# Patient Record
Sex: FEMALE | Race: BLACK OR AFRICAN AMERICAN | NOT HISPANIC OR LATINO
[De-identification: names, ages, dates, MRNs, and addresses within clinical notes are randomized per-mention and may not be internally consistent; named-entity substitution may affect disease eponyms.]

---

## 2017-02-28 ENCOUNTER — FORM ENCOUNTER (OUTPATIENT)
Age: 42
End: 2017-02-28

## 2017-03-01 ENCOUNTER — OUTPATIENT (OUTPATIENT)
Dept: OUTPATIENT SERVICES | Facility: HOSPITAL | Age: 42
LOS: 1 days | End: 2017-03-01
Payer: MEDICAID

## 2017-03-01 ENCOUNTER — OUTPATIENT (OUTPATIENT)
Dept: OUTPATIENT SERVICES | Facility: HOSPITAL | Age: 42
LOS: 1 days | End: 2017-03-01
Payer: COMMERCIAL

## 2017-03-01 DIAGNOSIS — E66.01 MORBID (SEVERE) OBESITY DUE TO EXCESS CALORIES: ICD-10-CM

## 2017-03-01 LAB
ALBUMIN SERPL ELPH-MCNC: 3.3 G/DL — LOW (ref 3.4–5)
ALP SERPL-CCNC: 56 U/L — SIGNIFICANT CHANGE UP (ref 40–120)
ALT FLD-CCNC: 19 U/L — SIGNIFICANT CHANGE UP (ref 12–42)
ANION GAP SERPL CALC-SCNC: 5 MMOL/L — LOW (ref 9–16)
APPEARANCE UR: CLEAR — SIGNIFICANT CHANGE UP
APTT BLD: 27.1 SEC — LOW (ref 27.5–37.4)
AST SERPL-CCNC: 15 U/L — SIGNIFICANT CHANGE UP (ref 15–37)
BACTERIA # UR AUTO: PRESENT /HPF
BILIRUB SERPL-MCNC: 0.5 MG/DL — SIGNIFICANT CHANGE UP (ref 0.2–1.2)
BILIRUB UR-MCNC: NEGATIVE — SIGNIFICANT CHANGE UP
BUN SERPL-MCNC: 18 MG/DL — SIGNIFICANT CHANGE UP (ref 7–23)
CALCIUM SERPL-MCNC: 8.7 MG/DL — SIGNIFICANT CHANGE UP (ref 8.5–10.5)
CHLORIDE SERPL-SCNC: 107 MMOL/L — SIGNIFICANT CHANGE UP (ref 96–108)
CO2 SERPL-SCNC: 29 MMOL/L — SIGNIFICANT CHANGE UP (ref 22–31)
COLOR SPEC: YELLOW — SIGNIFICANT CHANGE UP
CREAT SERPL-MCNC: 0.93 MG/DL — SIGNIFICANT CHANGE UP (ref 0.5–1.3)
DIFF PNL FLD: (no result)
EPI CELLS # UR: SIGNIFICANT CHANGE UP /HPF
GLUCOSE SERPL-MCNC: 76 MG/DL — SIGNIFICANT CHANGE UP (ref 70–99)
GLUCOSE UR QL: NEGATIVE — SIGNIFICANT CHANGE UP
HBA1C BLD-MCNC: 5.7 % — HIGH (ref 4.8–5.6)
HCT VFR BLD CALC: 36.1 % — SIGNIFICANT CHANGE UP (ref 34.5–45)
HGB BLD-MCNC: 11.6 G/DL — SIGNIFICANT CHANGE UP (ref 11.5–15.5)
INR BLD: 0.96 — SIGNIFICANT CHANGE UP (ref 0.88–1.16)
KETONES UR-MCNC: NEGATIVE — SIGNIFICANT CHANGE UP
LEUKOCYTE ESTERASE UR-ACNC: NEGATIVE — SIGNIFICANT CHANGE UP
MCHC RBC-ENTMCNC: 25.3 PG — LOW (ref 27–34)
MCHC RBC-ENTMCNC: 32.1 G/DL — SIGNIFICANT CHANGE UP (ref 32–36)
MCV RBC AUTO: 78.8 FL — LOW (ref 80–100)
NITRITE UR-MCNC: NEGATIVE — SIGNIFICANT CHANGE UP
PH UR: 7 — SIGNIFICANT CHANGE UP (ref 4–8)
PLATELET # BLD AUTO: 271 K/UL — SIGNIFICANT CHANGE UP (ref 150–400)
POTASSIUM SERPL-MCNC: 4.1 MMOL/L — SIGNIFICANT CHANGE UP (ref 3.5–5.3)
POTASSIUM SERPL-SCNC: 4.1 MMOL/L — SIGNIFICANT CHANGE UP (ref 3.5–5.3)
PROT SERPL-MCNC: 6.7 G/DL — SIGNIFICANT CHANGE UP (ref 6.4–8.2)
PROT UR-MCNC: 100 MG/DL
PROTHROM AB SERPL-ACNC: 10.7 SEC — SIGNIFICANT CHANGE UP (ref 10–13.1)
RBC # BLD: 4.58 M/UL — SIGNIFICANT CHANGE UP (ref 3.8–5.2)
RBC # FLD: 15.3 % — SIGNIFICANT CHANGE UP (ref 10.3–16.9)
RBC CASTS # UR COMP ASSIST: (no result) /HPF
SODIUM SERPL-SCNC: 141 MMOL/L — SIGNIFICANT CHANGE UP (ref 135–145)
SP GR SPEC: 1.02 — SIGNIFICANT CHANGE UP (ref 1–1.03)
TSH SERPL-MCNC: 1.2 UIU/ML — SIGNIFICANT CHANGE UP (ref 0.35–4.94)
UROBILINOGEN FLD QL: 0.2 E.U./DL — SIGNIFICANT CHANGE UP
WBC # BLD: 5.5 K/UL — SIGNIFICANT CHANGE UP (ref 3.8–10.5)
WBC # FLD AUTO: 5.5 K/UL — SIGNIFICANT CHANGE UP (ref 3.8–10.5)
WBC UR QL: < 5 /HPF — SIGNIFICANT CHANGE UP

## 2017-03-01 PROCEDURE — 81001 URINALYSIS AUTO W/SCOPE: CPT

## 2017-03-01 PROCEDURE — 71046 X-RAY EXAM CHEST 2 VIEWS: CPT

## 2017-03-01 PROCEDURE — 81003 URINALYSIS AUTO W/O SCOPE: CPT

## 2017-03-01 PROCEDURE — 80053 COMPREHEN METABOLIC PANEL: CPT

## 2017-03-01 PROCEDURE — 85730 THROMBOPLASTIN TIME PARTIAL: CPT

## 2017-03-01 PROCEDURE — 83036 HEMOGLOBIN GLYCOSYLATED A1C: CPT

## 2017-03-01 PROCEDURE — 84443 ASSAY THYROID STIM HORMONE: CPT

## 2017-03-01 PROCEDURE — 93010 ELECTROCARDIOGRAM REPORT: CPT

## 2017-03-01 PROCEDURE — 85027 COMPLETE CBC AUTOMATED: CPT

## 2017-03-01 PROCEDURE — 93005 ELECTROCARDIOGRAM TRACING: CPT

## 2017-03-01 PROCEDURE — 85610 PROTHROMBIN TIME: CPT

## 2017-03-01 PROCEDURE — 71020: CPT | Mod: 26

## 2017-03-03 ENCOUNTER — APPOINTMENT (OUTPATIENT)
Dept: BARIATRICS | Facility: CLINIC | Age: 42
End: 2017-03-03

## 2017-03-03 VITALS
HEART RATE: 71 BPM | WEIGHT: 241.03 LBS | OXYGEN SATURATION: 98 % | DIASTOLIC BLOOD PRESSURE: 98 MMHG | BODY MASS INDEX: 40.16 KG/M2 | HEIGHT: 65 IN | SYSTOLIC BLOOD PRESSURE: 143 MMHG | TEMPERATURE: 98.1 F

## 2017-03-03 RX ORDER — METFORMIN ER 500 MG 500 MG/1
500 TABLET ORAL
Qty: 120 | Refills: 0 | Status: DISCONTINUED | COMMUNITY
Start: 2016-02-10

## 2017-03-03 RX ORDER — AMOXICILLIN 500 MG/1
500 CAPSULE ORAL
Qty: 56 | Refills: 0 | Status: DISCONTINUED | COMMUNITY
Start: 2016-11-09

## 2017-03-03 RX ORDER — CLARITHROMYCIN 500 MG/1
500 TABLET, FILM COATED ORAL
Qty: 28 | Refills: 0 | Status: DISCONTINUED | COMMUNITY
Start: 2016-11-09

## 2017-03-03 RX ORDER — FLUOROMETHOLONE 1 MG/ML
0.1 SOLUTION/ DROPS OPHTHALMIC
Qty: 5 | Refills: 0 | Status: DISCONTINUED | COMMUNITY
Start: 2017-02-26

## 2017-03-03 RX ORDER — SUMATRIPTAN 50 MG/1
50 TABLET, FILM COATED ORAL
Qty: 9 | Refills: 0 | Status: DISCONTINUED | COMMUNITY
Start: 2016-11-08

## 2017-03-03 RX ORDER — ACETAMINOPHEN 500 MG/1
500 TABLET ORAL
Qty: 90 | Refills: 0 | Status: DISCONTINUED | COMMUNITY
Start: 2016-09-19

## 2017-03-03 RX ORDER — BISOPROLOL FUMARATE 5 MG/1
5 TABLET, FILM COATED ORAL
Qty: 60 | Refills: 0 | Status: DISCONTINUED | COMMUNITY
Start: 2017-01-05

## 2017-03-03 RX ORDER — RANITIDINE 150 MG/1
150 TABLET ORAL
Qty: 60 | Refills: 0 | Status: DISCONTINUED | COMMUNITY
Start: 2016-07-25

## 2017-03-03 RX ORDER — TOPIRAMATE 25 MG/1
25 TABLET, FILM COATED ORAL
Qty: 60 | Refills: 0 | Status: DISCONTINUED | COMMUNITY
Start: 2016-02-08

## 2017-03-03 RX ORDER — OMEPRAZOLE 40 MG/1
40 CAPSULE, DELAYED RELEASE ORAL
Qty: 28 | Refills: 0 | Status: DISCONTINUED | COMMUNITY
Start: 2016-11-09

## 2017-03-08 RX ORDER — SCOPALAMINE 1 MG/3D
1.5 PATCH, EXTENDED RELEASE TRANSDERMAL ONCE
Qty: 0 | Refills: 0 | Status: COMPLETED | OUTPATIENT
Start: 2017-03-13 | End: 2017-03-13

## 2017-03-10 VITALS
RESPIRATION RATE: 16 BRPM | TEMPERATURE: 97 F | SYSTOLIC BLOOD PRESSURE: 167 MMHG | OXYGEN SATURATION: 98 % | WEIGHT: 244.05 LBS | HEART RATE: 75 BPM | HEIGHT: 65 IN | DIASTOLIC BLOOD PRESSURE: 95 MMHG

## 2017-03-10 RX ORDER — METFORMIN HYDROCHLORIDE 850 MG/1
1 TABLET ORAL
Qty: 0 | Refills: 0 | COMMUNITY

## 2017-03-10 RX ORDER — RANITIDINE HYDROCHLORIDE 150 MG/1
0 TABLET, FILM COATED ORAL
Qty: 0 | Refills: 0 | COMMUNITY

## 2017-03-10 RX ORDER — PANTOPRAZOLE SODIUM 20 MG/1
1 TABLET, DELAYED RELEASE ORAL
Qty: 0 | Refills: 0 | COMMUNITY

## 2017-03-10 RX ORDER — ATORVASTATIN CALCIUM 80 MG/1
1 TABLET, FILM COATED ORAL
Qty: 0 | Refills: 0 | COMMUNITY

## 2017-03-10 RX ORDER — SUMATRIPTAN SUCCINATE 4 MG/.5ML
0 INJECTION, SOLUTION SUBCUTANEOUS
Qty: 0 | Refills: 0 | COMMUNITY

## 2017-03-10 NOTE — PRE-OP CHECKLIST - SELECT TESTS ORDERED
PT/PTT/EKG/BMP/INR/CBC/urine C&S, EGD report/CXR/Urinalysis urine C&S, EGD report, ucg - negative/BMP/CBC/INR/EKG/PT/PTT/CXR/Urinalysis

## 2017-03-12 ENCOUNTER — RESULT REVIEW (OUTPATIENT)
Age: 42
End: 2017-03-12

## 2017-03-13 ENCOUNTER — INPATIENT (INPATIENT)
Facility: HOSPITAL | Age: 42
LOS: 1 days | Discharge: HOME | DRG: 621 | End: 2017-03-15
Attending: SURGERY | Admitting: SURGERY
Payer: MEDICAID

## 2017-03-13 ENCOUNTER — APPOINTMENT (OUTPATIENT)
Dept: BARIATRICS | Facility: HOSPITAL | Age: 42
End: 2017-03-13

## 2017-03-13 DIAGNOSIS — E66.01 MORBID (SEVERE) OBESITY DUE TO EXCESS CALORIES: ICD-10-CM

## 2017-03-13 DIAGNOSIS — Z90.49 ACQUIRED ABSENCE OF OTHER SPECIFIED PARTS OF DIGESTIVE TRACT: Chronic | ICD-10-CM

## 2017-03-13 LAB
HCT VFR BLD CALC: 39.2 % — SIGNIFICANT CHANGE UP (ref 34.5–45)
HGB BLD-MCNC: 12.7 G/DL — SIGNIFICANT CHANGE UP (ref 11.5–15.5)
MCHC RBC-ENTMCNC: 25.8 PG — LOW (ref 27–34)
MCHC RBC-ENTMCNC: 32.4 G/DL — SIGNIFICANT CHANGE UP (ref 32–36)
MCV RBC AUTO: 79.5 FL — LOW (ref 80–100)
PLATELET # BLD AUTO: 283 K/UL — SIGNIFICANT CHANGE UP (ref 150–400)
RBC # BLD: 4.93 M/UL — SIGNIFICANT CHANGE UP (ref 3.8–5.2)
RBC # FLD: 15.3 % — SIGNIFICANT CHANGE UP (ref 10.3–16.9)
WBC # BLD: 10.4 K/UL — SIGNIFICANT CHANGE UP (ref 3.8–10.5)
WBC # FLD AUTO: 10.4 K/UL — SIGNIFICANT CHANGE UP (ref 3.8–10.5)

## 2017-03-13 PROCEDURE — 43775 LAP SLEEVE GASTRECTOMY: CPT

## 2017-03-13 RX ORDER — SUMATRIPTAN SUCCINATE 4 MG/.5ML
1 INJECTION, SOLUTION SUBCUTANEOUS
Qty: 0 | Refills: 0 | COMMUNITY

## 2017-03-13 RX ORDER — DEXTROSE 50 % IN WATER 50 %
25 SYRINGE (ML) INTRAVENOUS ONCE
Qty: 0 | Refills: 0 | Status: DISCONTINUED | OUTPATIENT
Start: 2017-03-13 | End: 2017-03-15

## 2017-03-13 RX ORDER — PANTOPRAZOLE SODIUM 20 MG/1
40 TABLET, DELAYED RELEASE ORAL DAILY
Qty: 0 | Refills: 0 | Status: DISCONTINUED | OUTPATIENT
Start: 2017-03-13 | End: 2017-03-14

## 2017-03-13 RX ORDER — ATORVASTATIN CALCIUM 80 MG/1
40 TABLET, FILM COATED ORAL AT BEDTIME
Qty: 0 | Refills: 0 | Status: DISCONTINUED | OUTPATIENT
Start: 2017-03-14 | End: 2017-03-15

## 2017-03-13 RX ORDER — BISOPROLOL FUMARATE 10 MG/1
1 TABLET, FILM COATED ORAL
Qty: 0 | Refills: 0 | COMMUNITY

## 2017-03-13 RX ORDER — SODIUM CHLORIDE 9 MG/ML
1000 INJECTION, SOLUTION INTRAVENOUS
Qty: 0 | Refills: 0 | Status: DISCONTINUED | OUTPATIENT
Start: 2017-03-13 | End: 2017-03-15

## 2017-03-13 RX ORDER — HYDROMORPHONE HYDROCHLORIDE 2 MG/ML
1 INJECTION INTRAMUSCULAR; INTRAVENOUS; SUBCUTANEOUS EVERY 4 HOURS
Qty: 0 | Refills: 0 | Status: DISCONTINUED | OUTPATIENT
Start: 2017-03-13 | End: 2017-03-14

## 2017-03-13 RX ORDER — HYDROMORPHONE HYDROCHLORIDE 2 MG/ML
0.5 INJECTION INTRAMUSCULAR; INTRAVENOUS; SUBCUTANEOUS
Qty: 0 | Refills: 0 | Status: COMPLETED | OUTPATIENT
Start: 2017-03-13 | End: 2017-03-13

## 2017-03-13 RX ORDER — ENOXAPARIN SODIUM 100 MG/ML
40 INJECTION SUBCUTANEOUS ONCE
Qty: 0 | Refills: 0 | Status: COMPLETED | OUTPATIENT
Start: 2017-03-13 | End: 2017-03-13

## 2017-03-13 RX ORDER — DEXTROSE 50 % IN WATER 50 %
12.5 SYRINGE (ML) INTRAVENOUS ONCE
Qty: 0 | Refills: 0 | Status: DISCONTINUED | OUTPATIENT
Start: 2017-03-13 | End: 2017-03-15

## 2017-03-13 RX ORDER — GLUCAGON INJECTION, SOLUTION 0.5 MG/.1ML
1 INJECTION, SOLUTION SUBCUTANEOUS ONCE
Qty: 0 | Refills: 0 | Status: DISCONTINUED | OUTPATIENT
Start: 2017-03-13 | End: 2017-03-15

## 2017-03-13 RX ORDER — INSULIN LISPRO 100/ML
VIAL (ML) SUBCUTANEOUS
Qty: 0 | Refills: 0 | Status: DISCONTINUED | OUTPATIENT
Start: 2017-03-13 | End: 2017-03-15

## 2017-03-13 RX ORDER — SODIUM CHLORIDE 9 MG/ML
1000 INJECTION, SOLUTION INTRAVENOUS
Qty: 0 | Refills: 0 | Status: DISCONTINUED | OUTPATIENT
Start: 2017-03-13 | End: 2017-03-14

## 2017-03-13 RX ORDER — ONDANSETRON 8 MG/1
4 TABLET, FILM COATED ORAL EVERY 6 HOURS
Qty: 0 | Refills: 0 | Status: DISCONTINUED | OUTPATIENT
Start: 2017-03-13 | End: 2017-03-15

## 2017-03-13 RX ORDER — METOCLOPRAMIDE HCL 10 MG
10 TABLET ORAL EVERY 6 HOURS
Qty: 0 | Refills: 0 | Status: DISCONTINUED | OUTPATIENT
Start: 2017-03-13 | End: 2017-03-15

## 2017-03-13 RX ORDER — DEXTROSE 50 % IN WATER 50 %
1 SYRINGE (ML) INTRAVENOUS ONCE
Qty: 0 | Refills: 0 | Status: DISCONTINUED | OUTPATIENT
Start: 2017-03-13 | End: 2017-03-15

## 2017-03-13 RX ORDER — HYDROMORPHONE HYDROCHLORIDE 2 MG/ML
0.5 INJECTION INTRAMUSCULAR; INTRAVENOUS; SUBCUTANEOUS EVERY 4 HOURS
Qty: 0 | Refills: 0 | Status: DISCONTINUED | OUTPATIENT
Start: 2017-03-13 | End: 2017-03-14

## 2017-03-13 RX ORDER — METOCLOPRAMIDE HCL 10 MG
10 TABLET ORAL EVERY 6 HOURS
Qty: 0 | Refills: 0 | Status: DISCONTINUED | OUTPATIENT
Start: 2017-03-13 | End: 2017-03-13

## 2017-03-13 RX ADMIN — HYDROMORPHONE HYDROCHLORIDE 0.5 MILLIGRAM(S): 2 INJECTION INTRAMUSCULAR; INTRAVENOUS; SUBCUTANEOUS at 17:21

## 2017-03-13 RX ADMIN — Medication 10 MILLIGRAM(S): at 19:08

## 2017-03-13 RX ADMIN — ONDANSETRON 4 MILLIGRAM(S): 8 TABLET, FILM COATED ORAL at 16:49

## 2017-03-13 RX ADMIN — HYDROMORPHONE HYDROCHLORIDE 1 MILLIGRAM(S): 2 INJECTION INTRAMUSCULAR; INTRAVENOUS; SUBCUTANEOUS at 19:50

## 2017-03-13 RX ADMIN — ENOXAPARIN SODIUM 40 MILLIGRAM(S): 100 INJECTION SUBCUTANEOUS at 11:52

## 2017-03-13 RX ADMIN — HYDROMORPHONE HYDROCHLORIDE 1 MILLIGRAM(S): 2 INJECTION INTRAMUSCULAR; INTRAVENOUS; SUBCUTANEOUS at 19:24

## 2017-03-13 RX ADMIN — PANTOPRAZOLE SODIUM 40 MILLIGRAM(S): 20 TABLET, DELAYED RELEASE ORAL at 15:19

## 2017-03-13 RX ADMIN — SCOPALAMINE 1.5 MILLIGRAM(S): 1 PATCH, EXTENDED RELEASE TRANSDERMAL at 11:52

## 2017-03-13 RX ADMIN — SODIUM CHLORIDE 150 MILLILITER(S): 9 INJECTION, SOLUTION INTRAVENOUS at 18:12

## 2017-03-13 RX ADMIN — HYDROMORPHONE HYDROCHLORIDE 0.5 MILLIGRAM(S): 2 INJECTION INTRAMUSCULAR; INTRAVENOUS; SUBCUTANEOUS at 16:40

## 2017-03-13 NOTE — H&P ADULT - NSHPPHYSICALEXAM_GEN_ALL_CORE
Gen: Patient in chair comfortable in NAD  Chest: CTABL, RRR, no m/r/g  Abdomen: obese, soft, non-tender, non-distended  Ext: no edema

## 2017-03-13 NOTE — H&P ADULT - ASSESSMENT
41 yo female with BMI 40.6, htn, hld, (previous diagnosis of DM, resolved on no meds) admitted after lap sleeve gastrectomy

## 2017-03-13 NOTE — PROGRESS NOTE ADULT - SUBJECTIVE AND OBJECTIVE BOX
POST-OP CHECK:    Procedure: LSG   Surgeon: Lanette  Findings: normal anatomy, previous lap pricila, lap sleeve gastrectomy performed with 2 black, 1 green, and 2 purple loads    S: Reports minimal pain localized to surgical site, adequately controlled on current pain meds. Denies N, V, CP, SOB, & calf tenderness.     O:   Vital Signs Last 24 Hrs  T(C): 36.6, Max: 36.6 (03-13 @ 14:25)  T(F): 97.8, Max: 97.8 (03-13 @ 14:25)  HR: 75 (75 - 92)  BP: 145/69 (145/69 - 176/95)  BP(mean): --  RR: 20 (14 - 20)  SpO2: 100% (97% - 100%)    Gen: NAD; A&Ox3; resting comfortably in bed.  CV: RRR.   Resp: CTAB; no increased WOB.   Abd: Soft, obese, appropriately TTP.   Wound: Port site incisions closed primarily; no signs of hematoma.  Extr: WWP; no edema; SCDs in place.

## 2017-03-13 NOTE — H&P ADULT - HISTORY OF PRESENT ILLNESS
41 yo female with PMHx of Htn, Hld, GERD, (previously diagnosed with DM and placed on metformin, but patient says this is resolved and not taking), with morbid obesity (BMI 40.6) who was admitted to [facility] after elective lap sleeve gastrectomy. The patient failed sustained weight loss on medical therapy alone as an outpatient.

## 2017-03-13 NOTE — PROVIDER CONTACT NOTE (OTHER) - BACKGROUND
noted surgical lap puncture sites x5 with Dermabond C/D/I. IV fluid in progress as ordered. positioned to comfort. oriented to unit, reinforced education given regarding use of call bell ---cont.--

## 2017-03-13 NOTE — PROVIDER CONTACT NOTE (OTHER) - SITUATION
received patient from recovery room via bed, awake alert responsive , oriented to name, place, time and situation. medicated for c/o nausea and pain with partial relief.

## 2017-03-13 NOTE — BRIEF OPERATIVE NOTE - OPERATION/FINDINGS
normal anatomy, previous lap pricila, lap sleeve gastrectomy performed with 2 black, 1 green, and 2 purple loads

## 2017-03-13 NOTE — H&P ADULT - PROBLEM SELECTOR PLAN 1
- admit to regional floor  - IVF  - pain/nausea control  - BCLD  - SCD  - UGI POD1  - protonix  - ooba  - IS  - 6hr post op CBC

## 2017-03-14 ENCOUNTER — TRANSCRIPTION ENCOUNTER (OUTPATIENT)
Age: 42
End: 2017-03-14

## 2017-03-14 LAB
ANION GAP SERPL CALC-SCNC: 7 MMOL/L — LOW (ref 9–16)
BUN SERPL-MCNC: 11 MG/DL — SIGNIFICANT CHANGE UP (ref 7–23)
CALCIUM SERPL-MCNC: 8.6 MG/DL — SIGNIFICANT CHANGE UP (ref 8.5–10.5)
CHLORIDE SERPL-SCNC: 103 MMOL/L — SIGNIFICANT CHANGE UP (ref 96–108)
CO2 SERPL-SCNC: 31 MMOL/L — SIGNIFICANT CHANGE UP (ref 22–31)
CREAT SERPL-MCNC: 1.02 MG/DL — SIGNIFICANT CHANGE UP (ref 0.5–1.3)
GLUCOSE SERPL-MCNC: 102 MG/DL — HIGH (ref 70–99)
HCT VFR BLD CALC: 37.1 % — SIGNIFICANT CHANGE UP (ref 34.5–45)
HGB BLD-MCNC: 12.1 G/DL — SIGNIFICANT CHANGE UP (ref 11.5–15.5)
MAGNESIUM SERPL-MCNC: 1.7 MG/DL — SIGNIFICANT CHANGE UP (ref 1.6–2.4)
MCHC RBC-ENTMCNC: 25.8 PG — LOW (ref 27–34)
MCHC RBC-ENTMCNC: 32.6 G/DL — SIGNIFICANT CHANGE UP (ref 32–36)
MCV RBC AUTO: 79.1 FL — LOW (ref 80–100)
PHOSPHATE SERPL-MCNC: 3.5 MG/DL — SIGNIFICANT CHANGE UP (ref 2.5–4.5)
PLATELET # BLD AUTO: 238 K/UL — SIGNIFICANT CHANGE UP (ref 150–400)
POTASSIUM SERPL-MCNC: 3.5 MMOL/L — SIGNIFICANT CHANGE UP (ref 3.5–5.3)
POTASSIUM SERPL-SCNC: 3.5 MMOL/L — SIGNIFICANT CHANGE UP (ref 3.5–5.3)
RBC # BLD: 4.69 M/UL — SIGNIFICANT CHANGE UP (ref 3.8–5.2)
RBC # FLD: 15.4 % — SIGNIFICANT CHANGE UP (ref 10.3–16.9)
SODIUM SERPL-SCNC: 141 MMOL/L — SIGNIFICANT CHANGE UP (ref 135–145)
WBC # BLD: 9.2 K/UL — SIGNIFICANT CHANGE UP (ref 3.8–10.5)
WBC # FLD AUTO: 9.2 K/UL — SIGNIFICANT CHANGE UP (ref 3.8–10.5)

## 2017-03-14 PROCEDURE — 74241: CPT | Mod: 26

## 2017-03-14 RX ORDER — SODIUM CHLORIDE 9 MG/ML
1000 INJECTION, SOLUTION INTRAVENOUS
Qty: 0 | Refills: 0 | Status: DISCONTINUED | OUTPATIENT
Start: 2017-03-14 | End: 2017-03-14

## 2017-03-14 RX ORDER — ASPIRIN/CALCIUM CARB/MAGNESIUM 324 MG
81 TABLET ORAL DAILY
Qty: 0 | Refills: 0 | Status: DISCONTINUED | OUTPATIENT
Start: 2017-03-14 | End: 2017-03-15

## 2017-03-14 RX ORDER — PANTOPRAZOLE SODIUM 20 MG/1
40 TABLET, DELAYED RELEASE ORAL DAILY
Qty: 0 | Refills: 0 | Status: DISCONTINUED | OUTPATIENT
Start: 2017-03-15 | End: 2017-03-15

## 2017-03-14 RX ORDER — SODIUM CHLORIDE 9 MG/ML
1000 INJECTION, SOLUTION INTRAVENOUS
Qty: 0 | Refills: 0 | Status: DISCONTINUED | OUTPATIENT
Start: 2017-03-14 | End: 2017-03-15

## 2017-03-14 RX ORDER — POTASSIUM CHLORIDE 20 MEQ
40 PACKET (EA) ORAL ONCE
Qty: 0 | Refills: 0 | Status: COMPLETED | OUTPATIENT
Start: 2017-03-14 | End: 2017-03-14

## 2017-03-14 RX ORDER — MAGNESIUM SULFATE 500 MG/ML
1 VIAL (ML) INJECTION ONCE
Qty: 0 | Refills: 0 | Status: COMPLETED | OUTPATIENT
Start: 2017-03-14 | End: 2017-03-14

## 2017-03-14 RX ADMIN — Medication 81 MILLIGRAM(S): at 12:20

## 2017-03-14 RX ADMIN — HYDROMORPHONE HYDROCHLORIDE 1 MILLIGRAM(S): 2 INJECTION INTRAMUSCULAR; INTRAVENOUS; SUBCUTANEOUS at 08:20

## 2017-03-14 RX ADMIN — ATORVASTATIN CALCIUM 40 MILLIGRAM(S): 80 TABLET, FILM COATED ORAL at 22:06

## 2017-03-14 RX ADMIN — HYDROMORPHONE HYDROCHLORIDE 1 MILLIGRAM(S): 2 INJECTION INTRAMUSCULAR; INTRAVENOUS; SUBCUTANEOUS at 02:10

## 2017-03-14 RX ADMIN — PANTOPRAZOLE SODIUM 40 MILLIGRAM(S): 20 TABLET, DELAYED RELEASE ORAL at 12:20

## 2017-03-14 RX ADMIN — Medication 100 GRAM(S): at 12:20

## 2017-03-14 RX ADMIN — SODIUM CHLORIDE 150 MILLILITER(S): 9 INJECTION, SOLUTION INTRAVENOUS at 02:10

## 2017-03-14 RX ADMIN — Medication 40 MILLIEQUIVALENT(S): at 12:20

## 2017-03-14 RX ADMIN — HYDROMORPHONE HYDROCHLORIDE 1 MILLIGRAM(S): 2 INJECTION INTRAMUSCULAR; INTRAVENOUS; SUBCUTANEOUS at 02:45

## 2017-03-14 RX ADMIN — HYDROMORPHONE HYDROCHLORIDE 1 MILLIGRAM(S): 2 INJECTION INTRAMUSCULAR; INTRAVENOUS; SUBCUTANEOUS at 07:58

## 2017-03-14 NOTE — DISCHARGE NOTE ADULT - HOSPITAL COURSE
This patient presented to [facility] for laparoscopic sleeve gastrectomy. The operation was uncomplicated and post-operative course was uneventful. Post-op, pain and nausea were controlled. The hemoglobin level was stable post-op thus anticoagulation was re-started per protocol. An upper GI series post-op showed no evidence of a leak. Patient's diet has been advanced to bariatric clear liquids, which has been tolerated without nausea/vomiting. Patient is ambulating without difficulty and voiding spontaneously with bowel function. Pain is well controlled on oral pain medication. This patient is deemed ready for discharge and will follow up with the surgeon in the outpatient setting.

## 2017-03-14 NOTE — DISCHARGE NOTE ADULT - PLAN OF CARE
Recovery post-op Follow up with Dr. Gama in 1 week. Call the office at  to schedule your appointment. You may shower; soap and water over incision sites. Do not scrub. Pat dry when done. No tub bathing or swimming until cleared. Keep incision sites out of the sun as scars will darken. No heavy lifting (>10lbs) or strenuous exercise. Diet: Bariatric Full Fluids. 60 grams protein daily.  64 fluid ounces water daily. Drink small sips throughout the day. Continue diet as outlined by paperwork received as a pre-operative patient. You should be urinating at least 3-4x per day. Call the office if you experience increasing abdominal pain, nausea, vomiting, or temperature >100.4F.  NO ASPIRIN OR NSAIDs until approved by Dr. Gama. Avoid alcoholic beverages until cleared by Dr. Gama.

## 2017-03-14 NOTE — DISCHARGE NOTE ADULT - CARE PROVIDERS DIRECT ADDRESSES
[email or web address]

## 2017-03-14 NOTE — DISCHARGE NOTE ADULT - MEDICATION SUMMARY - MEDICATIONS TO TAKE
I will START or STAY ON the medications listed below when I get home from the hospital:    Percocet 5/325 325 mg-5 mg oral tablet  -- 1 tab(s) by mouth every 4 to 6 hours, As Needed -for severe pain MDD:6  -- Caution federal law prohibits the transfer of this drug to any person other  than the person for whom it was prescribed.  May cause drowsiness.  Alcohol may intensify this effect.  Use care when operating dangerous machinery.  This prescription cannot be refilled.  This product contains acetaminophen.  Do not use  with any other product containing acetaminophen to prevent possible liver damage.  Using more of this medication than prescribed may cause serious breathing problems.    -- Indication: For Post operative pain    aspirin 81 mg oral tablet, chewable  -- 1 tab(s) by mouth once a day MDD:1  -- Chew tablets before swallowing  Take with food or milk.    -- Indication: For Anticoagulation     Topamax 25 mg oral tablet  -- 1 tab(s) by mouth once a day  -- Indication: For Migraine headache    atorvastatin 40 mg oral tablet  -- 1 tab(s) by mouth once a day  -- Indication: For Home medication    pantoprazole 40 mg oral delayed release tablet  -- 1 tab(s) by mouth once a day  -- Indication: For Home medication    Protonix 40 mg oral delayed release tablet  -- 1 tab(s) by mouth once a day MDD:1  -- It is very important that you take or use this exactly as directed.  Do not skip doses or discontinue unless directed by your doctor.  Obtain medical advice before taking any non-prescription drugs as some may affect the action of this medication.  Swallow whole.  Do not crush.    -- Indication: For Post operative acid reflux I will START or STAY ON the medications listed below when I get home from the hospital:    Percocet 5/325 325 mg-5 mg oral tablet  -- 1 tab(s) by mouth every 4 to 6 hours, As Needed -for severe pain MDD:6  -- Caution federal law prohibits the transfer of this drug to any person other  than the person for whom it was prescribed.  May cause drowsiness.  Alcohol may intensify this effect.  Use care when operating dangerous machinery.  This prescription cannot be refilled.  This product contains acetaminophen.  Do not use  with any other product containing acetaminophen to prevent possible liver damage.  Using more of this medication than prescribed may cause serious breathing problems.    -- Indication: For Post operative pain    aspirin 81 mg oral tablet, chewable  -- 1 tab(s) by mouth once a day MDD:1  -- Chew tablets before swallowing  Take with food or milk.    -- Indication: For Anticoagulation     Topamax 25 mg oral tablet  -- 1 tab(s) by mouth once a day  -- Indication: For Migraine headache    atorvastatin 40 mg oral tablet  -- 1 tab(s) by mouth once a day  -- Indication: For Home medication    Protonix 40 mg oral delayed release tablet  -- 1 tab(s) by mouth once a day MDD:1  -- It is very important that you take or use this exactly as directed.  Do not skip doses or discontinue unless directed by your doctor.  Obtain medical advice before taking any non-prescription drugs as some may affect the action of this medication.  Swallow whole.  Do not crush.    -- Indication: For Post operative acid reflux

## 2017-03-14 NOTE — DISCHARGE NOTE ADULT - MEDICATION SUMMARY - MEDICATIONS TO STOP TAKING
I will STOP taking the medications listed below when I get home from the hospital:    hydroCHLOROthiazide 12.5 mg oral tablet  -- 1 tab(s) by mouth once a day    pantoprazole 40 mg oral delayed release tablet  -- 1 tab(s) by mouth once a day

## 2017-03-14 NOTE — DISCHARGE NOTE ADULT - PATIENT PORTAL LINK FT
“You can access the [employer] Patient Portal, offered by [facility], by registering with the following website: [email or web address]”

## 2017-03-14 NOTE — PROGRESS NOTE ADULT - SUBJECTIVE AND OBJECTIVE BOX
O/N: Afebrile, VSS. Post op HH 12.7/39.2. passed TOV  3/13: POC WNL.     STATUS POST: laparoscopic sleeve gastrectomy     POST OP DAY #: 1 DAILY PROGRESS NOTE:    INTERVAL HPI / OVERNIGHT EVENTS:  O/N: Afebrile, VSS. Post op HH 12.7/39.2. passed TOV  3/13: POC WNL.     STATUS POST: laparoscopic sleeve gastrectomy     POST OP DAY #: 1    SUBJECTIVE:   Pt seen & examined at bedside. Pain controlled. Nauseated w/ small amt of spit up. No complaints. No F/C, CP/SOB.    MEDICATIONS  (STANDING):  lactated ringers. 1000milliLiter(s) IV Continuous <Continuous>  pantoprazole  Injectable 40milliGRAM(s) IV Push daily  insulin lispro (HumaLOG) corrective regimen sliding scale  SubCutaneous four times a day with meals  dextrose 5%. 1000milliLiter(s) IV Continuous <Continuous>  dextrose 50% Injectable 12.5Gram(s) IV Push once  dextrose 50% Injectable 25Gram(s) IV Push once  dextrose 50% Injectable 25Gram(s) IV Push once  atorvastatin 40milliGRAM(s) Oral at bedtime    MEDICATIONS  (PRN):  HYDROmorphone  Injectable 0.5milliGRAM(s) IV Push every 4 hours PRN Moderate Pain  HYDROmorphone  Injectable 1milliGRAM(s) IV Push every 4 hours PRN Severe Pain  ondansetron Injectable 4milliGRAM(s) IV Push every 6 hours PRN Nausea  dextrose Gel 1Dose(s) Oral once PRN Blood Glucose LESS THAN 70 milliGRAM(s)/deciliter  glucagon  Injectable 1milliGRAM(s) IntraMuscular once PRN Glucose LESS THAN 70 milligrams/deciliter  metoclopramide Injectable 10milliGRAM(s) IV Push every 6 hours PRN Nausea and/or Vomiting, When in between zofran doses, breakthrough nausea      Vital Signs Last 24 Hrs  T(C): 37.1, Max: 37.1 (03-13 @ 19:12)  T(F): 98.8, Max: 98.8 (03-13 @ 19:12)  HR: 78 (75 - 96)  BP: 167/83 (131/81 - 176/95)  BP(mean): --  RR: 17 (14 - 20)  SpO2: 95% (95% - 100%)    PHYSICAL EXAM:  Neuro: NAD, A&Ox3  Resp: No incr WOB  CV: NSR, RRR   Abd: Soft, obese, appropriately mildly TTP.   : Voids  Extr: WWP, no edema  Incision(s): Port sites closed primarily w/ surgical glue in place. No signs of hematoma.     I&O's Detail    I & Os for current day (as of 14 Mar 2017 07:40)  =============================================  IN:    lactated ringers.: 2700 ml    Total IN: 2700 ml  ---------------------------------------------  OUT:    Voided: 1650 ml    Total OUT: 1650 ml  ---------------------------------------------  Total NET: 1050 ml      LABS:                        12.7   10.4  )-----------( 283      ( 13 Mar 2017 20:59 )             39.2

## 2017-03-14 NOTE — DISCHARGE NOTE ADULT - CARE PLAN
Principal Discharge DX:	Morbid obesity  Goal:	Recovery post-op Principal Discharge DX:	Morbid obesity  Goal:	Recovery post-op  Instructions for follow-up, activity and diet:	Follow up with Dr. Gama in 1 week. Call the office at  to schedule your appointment. You may shower; soap and water over incision sites. Do not scrub. Pat dry when done. No tub bathing or swimming until cleared. Keep incision sites out of the sun as scars will darken. No heavy lifting (>10lbs) or strenuous exercise. Diet: Bariatric Full Fluids. 60 grams protein daily.  64 fluid ounces water daily. Drink small sips throughout the day. Continue diet as outlined by paperwork received as a pre-operative patient. You should be urinating at least 3-4x per day. Call the office if you experience increasing abdominal pain, nausea, vomiting, or temperature >100.4F.  NO ASPIRIN OR NSAIDs until approved by Dr. Gama. Avoid alcoholic beverages until cleared by Dr. Gama.

## 2017-03-14 NOTE — DIETITIAN INITIAL EVALUATION ADULT. - ETIOLOGY
r/t predicted hypercaloric intake prior to admission and unsatisfactory results with previous weight loss attempts

## 2017-03-14 NOTE — DISCHARGE NOTE ADULT - ADDITIONAL INSTRUCTIONS
1) Please take Percocet 1-2 tabs by mouth every 4 to 6 hours as needed for pain control.  2) Please take Protonix once a day.  3) Please take Aspirin 81 mg chewable once a day.

## 2017-03-14 NOTE — DIETITIAN INITIAL EVALUATION ADULT. - OTHER INFO
Pt is s/p sleeve gastrectomy.  Pt endorses good tolerance of bariatric clears thus far.  Pt denies GI distress; pain is being managed.  NKFA.  Skin: surgical incision.

## 2017-03-14 NOTE — DIETITIAN INITIAL EVALUATION ADULT. - NS AS NUTRI INTERV ED CONTENT
Provided extensive nutrition education s/p bariatric surgery regarding diet progression, hydration and vitamin supplementation.

## 2017-03-14 NOTE — DIETITIAN INITIAL EVALUATION ADULT. - ENERGY NEEDS
IBW used as pt is currently greater than 120% ideal body weight.  Needs adjusted s/p bariatric surgery.   20-25cal/kg IBW for maintenance.   568-682kcal (10-12cal/kg IBW)  85-119g pro (1.5-2.1g pro/kg IBW)  >/= 1920cc fluid

## 2017-03-15 VITALS
HEART RATE: 70 BPM | RESPIRATION RATE: 17 BRPM | SYSTOLIC BLOOD PRESSURE: 138 MMHG | DIASTOLIC BLOOD PRESSURE: 90 MMHG | TEMPERATURE: 99 F | OXYGEN SATURATION: 97 %

## 2017-03-15 LAB
ANION GAP SERPL CALC-SCNC: 7 MMOL/L — LOW (ref 9–16)
BUN SERPL-MCNC: 12 MG/DL — SIGNIFICANT CHANGE UP (ref 7–23)
CALCIUM SERPL-MCNC: 8.9 MG/DL — SIGNIFICANT CHANGE UP (ref 8.5–10.5)
CHLORIDE SERPL-SCNC: 101 MMOL/L — SIGNIFICANT CHANGE UP (ref 96–108)
CO2 SERPL-SCNC: 32 MMOL/L — HIGH (ref 22–31)
CREAT SERPL-MCNC: 0.95 MG/DL — SIGNIFICANT CHANGE UP (ref 0.5–1.3)
GLUCOSE SERPL-MCNC: 77 MG/DL — SIGNIFICANT CHANGE UP (ref 70–99)
HCT VFR BLD CALC: 37.2 % — SIGNIFICANT CHANGE UP (ref 34.5–45)
HGB BLD-MCNC: 11.7 G/DL — SIGNIFICANT CHANGE UP (ref 11.5–15.5)
MAGNESIUM SERPL-MCNC: 1.9 MG/DL — SIGNIFICANT CHANGE UP (ref 1.6–2.4)
MCHC RBC-ENTMCNC: 25.1 PG — LOW (ref 27–34)
MCHC RBC-ENTMCNC: 31.5 G/DL — LOW (ref 32–36)
MCV RBC AUTO: 79.8 FL — LOW (ref 80–100)
PHOSPHATE SERPL-MCNC: 2.7 MG/DL — SIGNIFICANT CHANGE UP (ref 2.5–4.5)
PLATELET # BLD AUTO: 262 K/UL — SIGNIFICANT CHANGE UP (ref 150–400)
POTASSIUM SERPL-MCNC: 3.8 MMOL/L — SIGNIFICANT CHANGE UP (ref 3.5–5.3)
POTASSIUM SERPL-SCNC: 3.8 MMOL/L — SIGNIFICANT CHANGE UP (ref 3.5–5.3)
RBC # BLD: 4.66 M/UL — SIGNIFICANT CHANGE UP (ref 3.8–5.2)
RBC # FLD: 15.3 % — SIGNIFICANT CHANGE UP (ref 10.3–16.9)
SODIUM SERPL-SCNC: 140 MMOL/L — SIGNIFICANT CHANGE UP (ref 135–145)
SURGICAL PATHOLOGY STUDY: SIGNIFICANT CHANGE UP
WBC # BLD: 7.9 K/UL — SIGNIFICANT CHANGE UP (ref 3.8–10.5)
WBC # FLD AUTO: 7.9 K/UL — SIGNIFICANT CHANGE UP (ref 3.8–10.5)

## 2017-03-15 PROCEDURE — 83735 ASSAY OF MAGNESIUM: CPT

## 2017-03-15 PROCEDURE — 86901 BLOOD TYPING SEROLOGIC RH(D): CPT

## 2017-03-15 PROCEDURE — 88307 TISSUE EXAM BY PATHOLOGIST: CPT

## 2017-03-15 PROCEDURE — 36415 COLL VENOUS BLD VENIPUNCTURE: CPT

## 2017-03-15 PROCEDURE — 86850 RBC ANTIBODY SCREEN: CPT

## 2017-03-15 PROCEDURE — 84100 ASSAY OF PHOSPHORUS: CPT

## 2017-03-15 PROCEDURE — 74241: CPT

## 2017-03-15 PROCEDURE — 80048 BASIC METABOLIC PNL TOTAL CA: CPT

## 2017-03-15 PROCEDURE — 86900 BLOOD TYPING SEROLOGIC ABO: CPT

## 2017-03-15 PROCEDURE — 85027 COMPLETE CBC AUTOMATED: CPT

## 2017-03-15 RX ORDER — PANTOPRAZOLE SODIUM 20 MG/1
1 TABLET, DELAYED RELEASE ORAL
Qty: 0 | Refills: 0 | COMMUNITY

## 2017-03-15 RX ORDER — PANTOPRAZOLE SODIUM 20 MG/1
1 TABLET, DELAYED RELEASE ORAL
Qty: 30 | Refills: 0 | OUTPATIENT
Start: 2017-03-15 | End: 2017-04-14

## 2017-03-15 RX ORDER — POTASSIUM CHLORIDE 20 MEQ
20 PACKET (EA) ORAL ONCE
Qty: 0 | Refills: 0 | Status: COMPLETED | OUTPATIENT
Start: 2017-03-15 | End: 2017-03-15

## 2017-03-15 RX ORDER — ASPIRIN/CALCIUM CARB/MAGNESIUM 324 MG
1 TABLET ORAL
Qty: 30 | Refills: 0 | OUTPATIENT
Start: 2017-03-15 | End: 2017-04-14

## 2017-03-15 RX ADMIN — Medication 20 MILLIEQUIVALENT(S): at 11:24

## 2017-03-15 RX ADMIN — PANTOPRAZOLE SODIUM 40 MILLIGRAM(S): 20 TABLET, DELAYED RELEASE ORAL at 11:24

## 2017-03-15 RX ADMIN — Medication 81 MILLIGRAM(S): at 11:24

## 2017-03-15 NOTE — PROGRESS NOTE ADULT - SUBJECTIVE AND OBJECTIVE BOX
INTERVAL HPI/OVERNIGHT EVENTS: O/N: good PO intake, PACO, AVSS  3/14: Started ASA. UGI WNL. Tolerating BCLD. VSS.    STATUS POST:  Lap sleeve gastrectomy    POST OPERATIVE DAY #: 2 INTERVAL HPI/OVERNIGHT EVENTS: O/N: good PO intake, PACO, AVSS  3/14: Started ASA. UGI WNL. Tolerating BCLD. VSS.    STATUS POST:  Lap sleeve gastrectomy    POST OPERATIVE DAY #: 2    SUBJECTIVE:   Pt seen & examined at bedside. Pain controlled. No complaints. Era BCLD. Voiding. No F/C, N/V, CP/SOB.    MEDICATIONS  (STANDING):  insulin lispro (HumaLOG) corrective regimen sliding scale  SubCutaneous four times a day with meals  dextrose 5%. 1000milliLiter(s) IV Continuous <Continuous>  dextrose 50% Injectable 12.5Gram(s) IV Push once  dextrose 50% Injectable 25Gram(s) IV Push once  dextrose 50% Injectable 25Gram(s) IV Push once  atorvastatin 40milliGRAM(s) Oral at bedtime  pantoprazole    Tablet 40milliGRAM(s) Oral daily  lactated ringers. 1000milliLiter(s) IV Continuous <Continuous>  aspirin  chewable 81milliGRAM(s) Oral daily    MEDICATIONS  (PRN):  ondansetron Injectable 4milliGRAM(s) IV Push every 6 hours PRN Nausea  dextrose Gel 1Dose(s) Oral once PRN Blood Glucose LESS THAN 70 milliGRAM(s)/deciliter  glucagon  Injectable 1milliGRAM(s) IntraMuscular once PRN Glucose LESS THAN 70 milligrams/deciliter  metoclopramide Injectable 10milliGRAM(s) IV Push every 6 hours PRN Nausea and/or Vomiting, When in between zofran doses, breakthrough nausea  oxyCODONE  5 mG/acetaminophen 325 mG 1Tablet(s) Oral every 4 hours PRN Moderate Pain (4 - 6)  oxyCODONE  5 mG/acetaminophen 325 mG 2Tablet(s) Oral every 4 hours PRN Severe Pain (7 - 10)      Vital Signs Last 24 Hrs  T(C): 36.6, Max: 37.2 (03-14 @ 09:30)  T(F): 97.8, Max: 98.9 (03-14 @ 09:30)  HR: 68 (63 - 69)  BP: 140/80 (134/87 - 157/90)  BP(mean): --  RR: 17 (17 - 17)  SpO2: 95% (94% - 98%)    PHYSICAL EXAM:  Neuro: NAD, A&Ox3  Resp: No incr WOB  CV: NSR, RRR   Abd: Soft, obese, appropriately mildly TTP.   : Voids  Extr: WWP, no edema  Incision(s): Port sites closed primarily w/ surgical glue in place. No signs of hematoma.     I&O's Detail  I & Os for 24h ending 14 Mar 2017 07:00  =============================================  IN:    lactated ringers.: 2700 ml    Total IN: 2700 ml  ---------------------------------------------  OUT:    Voided: 1650 ml    Total OUT: 1650 ml  ---------------------------------------------  Total NET: 1050 ml    I & Os for current day (as of 15 Mar 2017 06:20)  =============================================  IN:    lactated ringers.: 1800 ml    lactated ringers.: 450 ml    Oral Fluid: 120 ml    Total IN: 2370 ml  ---------------------------------------------  OUT:    Total OUT: 0 ml  ---------------------------------------------  Total NET: 2370 ml      LABS:                        12.1   9.2   )-----------( 238      ( 14 Mar 2017 11:07 )             37.1     14 Mar 2017 11:07    141    |  103    |  11     ----------------------------<  102    3.5     |  31     |  1.02     Ca    8.6        14 Mar 2017 11:07  Phos  3.5       14 Mar 2017 11:07  Mg     1.7       14 Mar 2017 11:07 INTERVAL HPI/OVERNIGHT EVENTS: O/N: good PO intake, PACO, AVSS  3/14: Started ASA. UGI WNL. Tolerating BCLD. VSS.    STATUS POST:  Lap sleeve gastrectomy    POST OPERATIVE DAY #: 2    SUBJECTIVE:   Pt seen & examined at bedside. Pain controlled. No complaints. Era BCLD. Voiding. No F/C, N/V, CP/SOB.    MEDICATIONS  (STANDING):  insulin lispro (HumaLOG) corrective regimen sliding scale  SubCutaneous four times a day with meals  dextrose 5%. 1000milliLiter(s) IV Continuous <Continuous>  dextrose 50% Injectable 12.5Gram(s) IV Push once  dextrose 50% Injectable 25Gram(s) IV Push once  dextrose 50% Injectable 25Gram(s) IV Push once  atorvastatin 40milliGRAM(s) Oral at bedtime  pantoprazole    Tablet 40milliGRAM(s) Oral daily  lactated ringers. 1000milliLiter(s) IV Continuous <Continuous>  aspirin  chewable 81milliGRAM(s) Oral daily    MEDICATIONS  (PRN):  ondansetron Injectable 4milliGRAM(s) IV Push every 6 hours PRN Nausea  dextrose Gel 1Dose(s) Oral once PRN Blood Glucose LESS THAN 70 milliGRAM(s)/deciliter  glucagon  Injectable 1milliGRAM(s) IntraMuscular once PRN Glucose LESS THAN 70 milligrams/deciliter  metoclopramide Injectable 10milliGRAM(s) IV Push every 6 hours PRN Nausea and/or Vomiting, When in between zofran doses, breakthrough nausea  oxyCODONE  5 mG/acetaminophen 325 mG 1Tablet(s) Oral every 4 hours PRN Moderate Pain (4 - 6)  oxyCODONE  5 mG/acetaminophen 325 mG 2Tablet(s) Oral every 4 hours PRN Severe Pain (7 - 10)      Vital Signs Last 24 Hrs  T(C): 36.6, Max: 37.2 (03-14 @ 09:30)  T(F): 97.8, Max: 98.9 (03-14 @ 09:30)  HR: 68 (63 - 69)  BP: 140/80 (134/87 - 157/90)  BP(mean): --  RR: 17 (17 - 17)  SpO2: 95% (94% - 98%)    PHYSICAL EXAM:  Neuro: NAD, A&Ox3  Resp: No incr WOB  CV: NSR, RRR   Abd: Soft, obese, NT.   : Voids  Extr: WWP, no edema  Incision(s): Port sites closed primarily w/ surgical glue in place. No signs of hematoma.     I&O's Detail  I & Os for 24h ending 14 Mar 2017 07:00  =============================================  IN:    lactated ringers.: 2700 ml    Total IN: 2700 ml  ---------------------------------------------  OUT:    Voided: 1650 ml    Total OUT: 1650 ml  ---------------------------------------------  Total NET: 1050 ml    I & Os for current day (as of 15 Mar 2017 06:20)  =============================================  IN:    lactated ringers.: 1800 ml    lactated ringers.: 450 ml    Oral Fluid: 120 ml    Total IN: 2370 ml  ---------------------------------------------  OUT:    Total OUT: 0 ml  ---------------------------------------------  Total NET: 2370 ml      LABS:                        12.1   9.2   )-----------( 238      ( 14 Mar 2017 11:07 )             37.1     14 Mar 2017 11:07    141    |  103    |  11     ----------------------------<  102    3.5     |  31     |  1.02     Ca    8.6        14 Mar 2017 11:07  Phos  3.5       14 Mar 2017 11:07  Mg     1.7       14 Mar 2017 11:07

## 2017-03-16 PROBLEM — E66.01 MORBID (SEVERE) OBESITY DUE TO EXCESS CALORIES: Chronic | Status: ACTIVE | Noted: 2017-03-10

## 2017-03-16 PROBLEM — M25.569 PAIN IN UNSPECIFIED KNEE: Chronic | Status: ACTIVE | Noted: 2017-03-10

## 2017-03-16 PROBLEM — E55.9 VITAMIN D DEFICIENCY, UNSPECIFIED: Chronic | Status: ACTIVE | Noted: 2017-03-10

## 2017-03-16 PROBLEM — G43.909 MIGRAINE, UNSPECIFIED, NOT INTRACTABLE, WITHOUT STATUS MIGRAINOSUS: Chronic | Status: ACTIVE | Noted: 2017-03-10

## 2017-03-16 PROBLEM — I10 ESSENTIAL (PRIMARY) HYPERTENSION: Chronic | Status: ACTIVE | Noted: 2017-03-10

## 2017-03-17 ENCOUNTER — MESSAGE (OUTPATIENT)
Age: 42
End: 2017-03-17

## 2017-03-17 DIAGNOSIS — E11.9 TYPE 2 DIABETES MELLITUS WITHOUT COMPLICATIONS: ICD-10-CM

## 2017-03-17 DIAGNOSIS — K21.9 GASTRO-ESOPHAGEAL REFLUX DISEASE WITHOUT ESOPHAGITIS: ICD-10-CM

## 2017-03-17 DIAGNOSIS — E66.01 MORBID (SEVERE) OBESITY DUE TO EXCESS CALORIES: ICD-10-CM

## 2017-03-17 DIAGNOSIS — I10 ESSENTIAL (PRIMARY) HYPERTENSION: ICD-10-CM

## 2017-03-17 DIAGNOSIS — E55.9 VITAMIN D DEFICIENCY, UNSPECIFIED: ICD-10-CM

## 2017-03-22 ENCOUNTER — APPOINTMENT (OUTPATIENT)
Dept: BARIATRICS | Facility: CLINIC | Age: 42
End: 2017-03-22

## 2017-03-22 VITALS
HEART RATE: 68 BPM | DIASTOLIC BLOOD PRESSURE: 76 MMHG | SYSTOLIC BLOOD PRESSURE: 114 MMHG | WEIGHT: 233 LBS | HEIGHT: 65 IN | BODY MASS INDEX: 38.82 KG/M2 | TEMPERATURE: 97.8 F

## 2017-03-22 RX ORDER — OXYCODONE AND ACETAMINOPHEN 5; 325 MG/1; MG/1
5-325 TABLET ORAL
Qty: 20 | Refills: 0 | Status: DISCONTINUED | COMMUNITY
Start: 2017-03-15

## 2017-04-19 ENCOUNTER — APPOINTMENT (OUTPATIENT)
Dept: BARIATRICS | Facility: CLINIC | Age: 42
End: 2017-04-19

## 2017-04-19 VITALS
DIASTOLIC BLOOD PRESSURE: 83 MMHG | BODY MASS INDEX: 36.99 KG/M2 | HEART RATE: 73 BPM | SYSTOLIC BLOOD PRESSURE: 129 MMHG | WEIGHT: 222 LBS | HEIGHT: 65 IN

## 2017-06-14 ENCOUNTER — APPOINTMENT (OUTPATIENT)
Dept: BARIATRICS | Facility: CLINIC | Age: 42
End: 2017-06-14

## 2017-06-14 VITALS
BODY MASS INDEX: 34.86 KG/M2 | SYSTOLIC BLOOD PRESSURE: 99 MMHG | DIASTOLIC BLOOD PRESSURE: 66 MMHG | WEIGHT: 209.25 LBS | TEMPERATURE: 97.8 F | HEART RATE: 65 BPM | OXYGEN SATURATION: 99 % | HEIGHT: 65 IN

## 2017-06-14 DIAGNOSIS — R11.10 VOMITING, UNSPECIFIED: ICD-10-CM

## 2017-06-14 RX ORDER — ASPIRIN 81 MG/1
81 TABLET, CHEWABLE ORAL
Qty: 30 | Refills: 0 | Status: DISCONTINUED | COMMUNITY
Start: 2017-03-15 | End: 2017-06-14

## 2017-06-22 ENCOUNTER — OTHER (OUTPATIENT)
Age: 42
End: 2017-06-22

## 2017-07-19 ENCOUNTER — APPOINTMENT (OUTPATIENT)
Dept: BARIATRICS | Facility: CLINIC | Age: 42
End: 2017-07-19

## 2017-07-19 VITALS
HEIGHT: 65 IN | HEART RATE: 67 BPM | BODY MASS INDEX: 32.99 KG/M2 | SYSTOLIC BLOOD PRESSURE: 114 MMHG | WEIGHT: 198 LBS | DIASTOLIC BLOOD PRESSURE: 79 MMHG

## 2017-07-24 ENCOUNTER — APPOINTMENT (OUTPATIENT)
Dept: BARIATRICS | Facility: CLINIC | Age: 42
End: 2017-07-24

## 2017-07-24 VITALS — HEIGHT: 65 IN | WEIGHT: 197 LBS | BODY MASS INDEX: 32.82 KG/M2

## 2017-09-05 LAB
25(OH)D3 SERPL-MCNC: 40.7 NG/ML
A-TOCOPHEROL VIT E SERPL-MCNC: 16.8 MG/L
ALBUMIN SERPL ELPH-MCNC: 3.6 G/DL
ALP BLD-CCNC: 44 U/L
ALT SERPL-CCNC: 8 U/L
ANION GAP SERPL CALC-SCNC: 13 MMOL/L
AST SERPL-CCNC: 17 U/L
BASOPHILS # BLD AUTO: 0.01 K/UL
BASOPHILS NFR BLD AUTO: 0.3 %
BETA+GAMMA TOCOPHEROL SERPL-MCNC: <1 MG/L
BILIRUB SERPL-MCNC: 0.4 MG/DL
BUN SERPL-MCNC: 13 MG/DL
CA-I SERPL-SCNC: 1.24 MMOL/L
CALCIUM SERPL-MCNC: 9.1 MG/DL
CALCIUM SERPL-MCNC: 9.1 MG/DL
CHLORIDE SERPL-SCNC: 106 MMOL/L
CHOLEST SERPL-MCNC: 247 MG/DL
CHOLEST/HDLC SERPL: 4.2 RATIO
CO2 SERPL-SCNC: 25 MMOL/L
CREAT SERPL-MCNC: 0.94 MG/DL
EOSINOPHIL # BLD AUTO: 0.03 K/UL
EOSINOPHIL NFR BLD AUTO: 0.9 %
FOLATE SERPL-MCNC: 13.8 NG/ML
GLUCOSE SERPL-MCNC: 86 MG/DL
HBA1C MFR BLD HPLC: 5.6 %
HCT VFR BLD CALC: 34.9 %
HDLC SERPL-MCNC: 59 MG/DL
HGB BLD-MCNC: 11.1 G/DL
IMM GRANULOCYTES NFR BLD AUTO: 0 %
IRON SATN MFR SERPL: 39 %
IRON SERPL-MCNC: 80 UG/DL
LDLC SERPL CALC-MCNC: 169 MG/DL
LYMPHOCYTES # BLD AUTO: 1.22 K/UL
LYMPHOCYTES NFR BLD AUTO: 38.1 %
MAN DIFF?: NORMAL
MCHC RBC-ENTMCNC: 25.9 PG
MCHC RBC-ENTMCNC: 31.8 GM/DL
MCV RBC AUTO: 81.4 FL
MONOCYTES # BLD AUTO: 0.25 K/UL
MONOCYTES NFR BLD AUTO: 7.8 %
NEUTROPHILS # BLD AUTO: 1.69 K/UL
NEUTROPHILS NFR BLD AUTO: 52.9 %
PARATHYROID HORMONE INTACT: 49 PG/ML
PLATELET # BLD AUTO: 266 K/UL
POTASSIUM SERPL-SCNC: 4.4 MMOL/L
PREALB SERPL NEPH-MCNC: 21 MG/DL
PROT SERPL-MCNC: 6.2 G/DL
RBC # BLD: 4.29 M/UL
RBC # FLD: 15.4 %
SODIUM SERPL-SCNC: 144 MMOL/L
TIBC SERPL-MCNC: 204 UG/DL
TRIGL SERPL-MCNC: 94 MG/DL
TSH SERPL-ACNC: 0.75 UIU/ML
UIBC SERPL-MCNC: 124 UG/DL
VIT A SERPL-MCNC: 45 UG/DL
VIT B1 SERPL-MCNC: 122.4 NMOL/L
VIT B12 SERPL-MCNC: 1028 PG/ML
WBC # FLD AUTO: 3.2 K/UL
ZINC SERPL-MCNC: 70 UG/DL

## 2017-09-08 ENCOUNTER — APPOINTMENT (OUTPATIENT)
Dept: BARIATRICS | Facility: CLINIC | Age: 42
End: 2017-09-08
Payer: MEDICAID

## 2017-09-08 VITALS
BODY MASS INDEX: 31.9 KG/M2 | DIASTOLIC BLOOD PRESSURE: 90 MMHG | WEIGHT: 191.5 LBS | SYSTOLIC BLOOD PRESSURE: 124 MMHG | HEART RATE: 64 BPM | TEMPERATURE: 97.6 F | HEIGHT: 65 IN | OXYGEN SATURATION: 99 %

## 2017-09-08 DIAGNOSIS — E78.5 HYPERLIPIDEMIA, UNSPECIFIED: ICD-10-CM

## 2017-09-08 PROCEDURE — 99213 OFFICE O/P EST LOW 20 MIN: CPT

## 2017-11-02 ENCOUNTER — RESULT REVIEW (OUTPATIENT)
Age: 42
End: 2017-11-02

## 2017-12-08 ENCOUNTER — APPOINTMENT (OUTPATIENT)
Dept: BARIATRICS | Facility: CLINIC | Age: 42
End: 2017-12-08
Payer: MEDICAID

## 2017-12-08 VITALS
OXYGEN SATURATION: 98 % | WEIGHT: 181 LBS | BODY MASS INDEX: 30.16 KG/M2 | DIASTOLIC BLOOD PRESSURE: 80 MMHG | SYSTOLIC BLOOD PRESSURE: 120 MMHG | HEART RATE: 69 BPM | TEMPERATURE: 98.1 F | HEIGHT: 65 IN

## 2017-12-08 PROCEDURE — 99213 OFFICE O/P EST LOW 20 MIN: CPT

## 2017-12-08 RX ORDER — ATORVASTATIN CALCIUM 20 MG/1
20 TABLET, FILM COATED ORAL
Qty: 30 | Refills: 0 | Status: DISCONTINUED | COMMUNITY
Start: 2017-11-13

## 2018-01-23 VITALS
RESPIRATION RATE: 18 BRPM | HEIGHT: 65 IN | HEART RATE: 78 BPM | SYSTOLIC BLOOD PRESSURE: 135 MMHG | DIASTOLIC BLOOD PRESSURE: 99 MMHG | OXYGEN SATURATION: 100 % | WEIGHT: 179.9 LBS | TEMPERATURE: 98 F

## 2018-01-23 LAB
ALBUMIN SERPL ELPH-MCNC: 3.7 G/DL — SIGNIFICANT CHANGE UP (ref 3.3–5)
ALP SERPL-CCNC: 43 U/L — SIGNIFICANT CHANGE UP (ref 40–120)
ALT FLD-CCNC: 11 U/L — SIGNIFICANT CHANGE UP (ref 10–45)
ANION GAP SERPL CALC-SCNC: 7 MMOL/L — SIGNIFICANT CHANGE UP (ref 5–17)
APPEARANCE UR: CLEAR — SIGNIFICANT CHANGE UP
AST SERPL-CCNC: 19 U/L — SIGNIFICANT CHANGE UP (ref 10–40)
BASOPHILS NFR BLD AUTO: 0.5 % — SIGNIFICANT CHANGE UP (ref 0–2)
BILIRUB SERPL-MCNC: <0.2 MG/DL — SIGNIFICANT CHANGE UP (ref 0.2–1.2)
BILIRUB UR-MCNC: NEGATIVE — SIGNIFICANT CHANGE UP
BUN SERPL-MCNC: 12 MG/DL — SIGNIFICANT CHANGE UP (ref 7–23)
CALCIUM SERPL-MCNC: 8.7 MG/DL — SIGNIFICANT CHANGE UP (ref 8.4–10.5)
CHLORIDE SERPL-SCNC: 104 MMOL/L — SIGNIFICANT CHANGE UP (ref 96–108)
CO2 SERPL-SCNC: 29 MMOL/L — SIGNIFICANT CHANGE UP (ref 22–31)
COLOR SPEC: YELLOW — SIGNIFICANT CHANGE UP
CREAT SERPL-MCNC: 0.93 MG/DL — SIGNIFICANT CHANGE UP (ref 0.5–1.3)
DIFF PNL FLD: (no result)
EOSINOPHIL NFR BLD AUTO: 0.9 % — SIGNIFICANT CHANGE UP (ref 0–6)
GLUCOSE SERPL-MCNC: 102 MG/DL — HIGH (ref 70–99)
GLUCOSE UR QL: NEGATIVE — SIGNIFICANT CHANGE UP
HCT VFR BLD CALC: 32.1 % — LOW (ref 34.5–45)
HGB BLD-MCNC: 10.3 G/DL — LOW (ref 11.5–15.5)
KETONES UR-MCNC: (no result) MG/DL
LEUKOCYTE ESTERASE UR-ACNC: NEGATIVE — SIGNIFICANT CHANGE UP
LIDOCAIN IGE QN: 54 U/L — SIGNIFICANT CHANGE UP (ref 7–60)
LYMPHOCYTES # BLD AUTO: 47.7 % — HIGH (ref 13–44)
MCHC RBC-ENTMCNC: 25.6 PG — LOW (ref 27–34)
MCHC RBC-ENTMCNC: 32.1 G/DL — SIGNIFICANT CHANGE UP (ref 32–36)
MCV RBC AUTO: 79.7 FL — LOW (ref 80–100)
MONOCYTES NFR BLD AUTO: 6.8 % — SIGNIFICANT CHANGE UP (ref 2–14)
NEUTROPHILS NFR BLD AUTO: 44.1 % — SIGNIFICANT CHANGE UP (ref 43–77)
NITRITE UR-MCNC: NEGATIVE — SIGNIFICANT CHANGE UP
PH UR: 6 — SIGNIFICANT CHANGE UP (ref 5–8)
PLATELET # BLD AUTO: 283 K/UL — SIGNIFICANT CHANGE UP (ref 150–400)
POTASSIUM SERPL-MCNC: 3.9 MMOL/L — SIGNIFICANT CHANGE UP (ref 3.5–5.3)
POTASSIUM SERPL-SCNC: 3.9 MMOL/L — SIGNIFICANT CHANGE UP (ref 3.5–5.3)
PROT SERPL-MCNC: 6.5 G/DL — SIGNIFICANT CHANGE UP (ref 6–8.3)
PROT UR-MCNC: 100 MG/DL
RBC # BLD: 4.03 M/UL — SIGNIFICANT CHANGE UP (ref 3.8–5.2)
RBC # FLD: 14.9 % — SIGNIFICANT CHANGE UP (ref 10.3–16.9)
SODIUM SERPL-SCNC: 140 MMOL/L — SIGNIFICANT CHANGE UP (ref 135–145)
SP GR SPEC: >=1.03 — SIGNIFICANT CHANGE UP (ref 1–1.03)
UROBILINOGEN FLD QL: 0.2 E.U./DL — SIGNIFICANT CHANGE UP
WBC # BLD: 4.4 K/UL — SIGNIFICANT CHANGE UP (ref 3.8–10.5)
WBC # FLD AUTO: 4.4 K/UL — SIGNIFICANT CHANGE UP (ref 3.8–10.5)

## 2018-01-23 PROCEDURE — 74177 CT ABD & PELVIS W/CONTRAST: CPT | Mod: 26

## 2018-01-23 PROCEDURE — 99285 EMERGENCY DEPT VISIT HI MDM: CPT | Mod: 25

## 2018-01-23 PROCEDURE — 93010 ELECTROCARDIOGRAM REPORT: CPT

## 2018-01-23 RX ORDER — SODIUM CHLORIDE 9 MG/ML
3 INJECTION INTRAMUSCULAR; INTRAVENOUS; SUBCUTANEOUS ONCE
Qty: 0 | Refills: 0 | Status: COMPLETED | OUTPATIENT
Start: 2018-01-23 | End: 2018-01-23

## 2018-01-23 RX ORDER — ONDANSETRON 8 MG/1
4 TABLET, FILM COATED ORAL ONCE
Qty: 0 | Refills: 0 | Status: COMPLETED | OUTPATIENT
Start: 2018-01-23 | End: 2018-01-23

## 2018-01-23 RX ORDER — MORPHINE SULFATE 50 MG/1
4 CAPSULE, EXTENDED RELEASE ORAL ONCE
Qty: 0 | Refills: 0 | Status: DISCONTINUED | OUTPATIENT
Start: 2018-01-23 | End: 2018-01-23

## 2018-01-23 RX ORDER — TOPIRAMATE 25 MG
1 TABLET ORAL
Qty: 0 | Refills: 0 | COMMUNITY

## 2018-01-23 RX ORDER — SODIUM CHLORIDE 9 MG/ML
1000 INJECTION INTRAMUSCULAR; INTRAVENOUS; SUBCUTANEOUS ONCE
Qty: 0 | Refills: 0 | Status: COMPLETED | OUTPATIENT
Start: 2018-01-23 | End: 2018-01-23

## 2018-01-23 RX ORDER — ATORVASTATIN CALCIUM 80 MG/1
1 TABLET, FILM COATED ORAL
Qty: 0 | Refills: 0 | COMMUNITY

## 2018-01-23 RX ORDER — IOHEXOL 300 MG/ML
50 INJECTION, SOLUTION INTRAVENOUS ONCE
Qty: 0 | Refills: 0 | Status: COMPLETED | OUTPATIENT
Start: 2018-01-23 | End: 2018-01-23

## 2018-01-23 RX ADMIN — MORPHINE SULFATE 4 MILLIGRAM(S): 50 CAPSULE, EXTENDED RELEASE ORAL at 23:10

## 2018-01-23 RX ADMIN — ONDANSETRON 4 MILLIGRAM(S): 8 TABLET, FILM COATED ORAL at 19:57

## 2018-01-23 RX ADMIN — SODIUM CHLORIDE 3 MILLILITER(S): 9 INJECTION INTRAMUSCULAR; INTRAVENOUS; SUBCUTANEOUS at 19:35

## 2018-01-23 RX ADMIN — MORPHINE SULFATE 4 MILLIGRAM(S): 50 CAPSULE, EXTENDED RELEASE ORAL at 19:57

## 2018-01-23 RX ADMIN — IOHEXOL 50 MILLILITER(S): 300 INJECTION, SOLUTION INTRAVENOUS at 20:00

## 2018-01-23 RX ADMIN — SODIUM CHLORIDE 1000 MILLILITER(S): 9 INJECTION INTRAMUSCULAR; INTRAVENOUS; SUBCUTANEOUS at 19:42

## 2018-01-23 RX ADMIN — MORPHINE SULFATE 4 MILLIGRAM(S): 50 CAPSULE, EXTENDED RELEASE ORAL at 23:53

## 2018-01-23 NOTE — ED PROVIDER NOTE - PROGRESS NOTE DETAILS
Surgery consulted and will see the pt Pt seen by surgery. Requesting PO challenge, and awaiting attending read. Pt received from Dr Veliz pending po challenge and final read.  Pt feeling somewhat better but c/o n when trying to po.  Await re-eval and dispo by surg.

## 2018-01-23 NOTE — ED ADULT NURSE NOTE - OBJECTIVE STATEMENT
Patient is a 43yo female complaining of 2 days of epigastric/lower abdominal pain, nausea, vomiting. Patient reports pain radiates to the back. LMP ended yesterday. Denies chest pain, fevers, injury.

## 2018-01-23 NOTE — ED PROVIDER NOTE - OBJECTIVE STATEMENT
Pt w/ PMHx lap band (3/17, Dr Gama), cholecystectomy p/w L sided mid- abd pain x 2 days. The pain is intermittent, but has become constant, increasing in severity. The pain is described as burning and cramping in nature, and sometimes radiates to the flank. Pt reports n/v x 3 today. No diarrhea or constipation. No obstipation. + inc urinary frequency. No dysuria, hematuria, urgency, or hesitancy. no CP, SOB. The pain is not affected by movement. The pain is affected by eating (worsened). No f/c.  LMP 6 days ago. No vaginal bleeding or discharge.

## 2018-01-23 NOTE — ED PROVIDER NOTE - CARE PLAN
Principal Discharge DX:	Left upper quadrant pain Principal Discharge DX:	Left upper quadrant pain  Secondary Diagnosis:	Bowel obstruction

## 2018-01-23 NOTE — ED PROVIDER NOTE - MEDICAL DECISION MAKING DETAILS
Pt p/w L mid, LUQ pain, sometimes radiating to the flank. + abdominal ttp, no CVAT. Pt p/w L mid, LUQ pain, sometimes radiating to the flank. + abdominal ttp, no CVAT. DDx includes gastritis, pancreatitis, colitis, lap band slippage, partial SBO / SBO, renal colic, other pathology. Check labs, UA, CT a/p, IVF, analgesia. Dispo pending w/u and clinical status

## 2018-01-23 NOTE — ED ADULT TRIAGE NOTE - CHIEF COMPLAINT QUOTE
Pt CO Abd Pain with N/V since yesterday.  Pt states "I've thrown up 3 times today."  PT denies recent falls, trauma, dizziness, Diarrhea, SOB, Fevers and CP.

## 2018-01-23 NOTE — ED PROVIDER NOTE - GASTROINTESTINAL, MLM
Abd soft, ND, NABS. + moderate ttp in the epigastric region, LUQ, L mid abd. No guarding, rebound, or rigidity. No pulsatile abdominal masses. No organomegaly appreciated. No CVAT

## 2018-01-24 ENCOUNTER — INPATIENT (INPATIENT)
Facility: HOSPITAL | Age: 43
LOS: 0 days | Discharge: HOME | DRG: 390 | End: 2018-01-25
Attending: SURGERY | Admitting: SURGERY
Payer: MEDICAID

## 2018-01-24 DIAGNOSIS — Z90.49 ACQUIRED ABSENCE OF OTHER SPECIFIED PARTS OF DIGESTIVE TRACT: Chronic | ICD-10-CM

## 2018-01-24 DIAGNOSIS — Z90.3 ACQUIRED ABSENCE OF STOMACH [PART OF]: Chronic | ICD-10-CM

## 2018-01-24 LAB
ALBUMIN SERPL ELPH-MCNC: 3.2 G/DL — LOW (ref 3.3–5)
ALP SERPL-CCNC: 72 U/L — SIGNIFICANT CHANGE UP (ref 40–120)
ALT FLD-CCNC: 137 U/L — HIGH (ref 10–45)
ANION GAP SERPL CALC-SCNC: 9 MMOL/L — SIGNIFICANT CHANGE UP (ref 5–17)
AST SERPL-CCNC: 282 U/L — HIGH (ref 10–40)
BILIRUB SERPL-MCNC: 0.4 MG/DL — SIGNIFICANT CHANGE UP (ref 0.2–1.2)
BUN SERPL-MCNC: 9 MG/DL — SIGNIFICANT CHANGE UP (ref 7–23)
CALCIUM SERPL-MCNC: 8.4 MG/DL — SIGNIFICANT CHANGE UP (ref 8.4–10.5)
CHLORIDE SERPL-SCNC: 104 MMOL/L — SIGNIFICANT CHANGE UP (ref 96–108)
CO2 SERPL-SCNC: 26 MMOL/L — SIGNIFICANT CHANGE UP (ref 22–31)
CREAT SERPL-MCNC: 0.91 MG/DL — SIGNIFICANT CHANGE UP (ref 0.5–1.3)
FERRITIN SERPL-MCNC: 13.9 NG/ML — LOW (ref 15–150)
FOLATE SERPL-MCNC: 19 NG/ML — SIGNIFICANT CHANGE UP (ref 4.8–24.2)
GLUCOSE SERPL-MCNC: 84 MG/DL — SIGNIFICANT CHANGE UP (ref 70–99)
HCT VFR BLD CALC: 30.8 % — LOW (ref 34.5–45)
HGB BLD-MCNC: 9.8 G/DL — LOW (ref 11.5–15.5)
MAGNESIUM SERPL-MCNC: 1.9 MG/DL — SIGNIFICANT CHANGE UP (ref 1.6–2.6)
MCHC RBC-ENTMCNC: 25.5 PG — LOW (ref 27–34)
MCHC RBC-ENTMCNC: 31.8 G/DL — LOW (ref 32–36)
MCV RBC AUTO: 80 FL — SIGNIFICANT CHANGE UP (ref 80–100)
PHOSPHATE SERPL-MCNC: 4.3 MG/DL — SIGNIFICANT CHANGE UP (ref 2.5–4.5)
PLATELET # BLD AUTO: 252 K/UL — SIGNIFICANT CHANGE UP (ref 150–400)
POTASSIUM SERPL-MCNC: 4.2 MMOL/L — SIGNIFICANT CHANGE UP (ref 3.5–5.3)
POTASSIUM SERPL-SCNC: 4.2 MMOL/L — SIGNIFICANT CHANGE UP (ref 3.5–5.3)
PREALB SERPL-MCNC: 19 MG/DL — LOW (ref 20–40)
PROT SERPL-MCNC: 5.8 G/DL — LOW (ref 6–8.3)
RBC # BLD: 3.85 M/UL — SIGNIFICANT CHANGE UP (ref 3.8–5.2)
RBC # FLD: 15.3 % — SIGNIFICANT CHANGE UP (ref 10.3–16.9)
SODIUM SERPL-SCNC: 139 MMOL/L — SIGNIFICANT CHANGE UP (ref 135–145)
TRANSFERRIN SERPL-MCNC: 206 MG/DL — SIGNIFICANT CHANGE UP (ref 200–360)
VIT B12 SERPL-MCNC: 1766 PG/ML — HIGH (ref 232–1245)
WBC # BLD: 4.2 K/UL — SIGNIFICANT CHANGE UP (ref 3.8–10.5)
WBC # FLD AUTO: 4.2 K/UL — SIGNIFICANT CHANGE UP (ref 3.8–10.5)

## 2018-01-24 PROCEDURE — 74245: CPT | Mod: 26

## 2018-01-24 RX ORDER — ONDANSETRON 8 MG/1
4 TABLET, FILM COATED ORAL EVERY 6 HOURS
Qty: 0 | Refills: 0 | Status: DISCONTINUED | OUTPATIENT
Start: 2018-01-24 | End: 2018-01-25

## 2018-01-24 RX ORDER — HEPARIN SODIUM 5000 [USP'U]/ML
5000 INJECTION INTRAVENOUS; SUBCUTANEOUS EVERY 8 HOURS
Qty: 0 | Refills: 0 | Status: DISCONTINUED | OUTPATIENT
Start: 2018-01-24 | End: 2018-01-25

## 2018-01-24 RX ORDER — SODIUM CHLORIDE 9 MG/ML
1000 INJECTION, SOLUTION INTRAVENOUS
Qty: 0 | Refills: 0 | Status: DISCONTINUED | OUTPATIENT
Start: 2018-01-24 | End: 2018-01-25

## 2018-01-24 RX ORDER — ONDANSETRON 8 MG/1
4 TABLET, FILM COATED ORAL ONCE
Qty: 0 | Refills: 0 | Status: COMPLETED | OUTPATIENT
Start: 2018-01-24 | End: 2018-01-24

## 2018-01-24 RX ORDER — ACETAMINOPHEN 500 MG
650 TABLET ORAL ONCE
Qty: 0 | Refills: 0 | Status: COMPLETED | OUTPATIENT
Start: 2018-01-24 | End: 2018-01-24

## 2018-01-24 RX ADMIN — Medication 650 MILLIGRAM(S): at 22:55

## 2018-01-24 RX ADMIN — HEPARIN SODIUM 5000 UNIT(S): 5000 INJECTION INTRAVENOUS; SUBCUTANEOUS at 14:45

## 2018-01-24 RX ADMIN — ONDANSETRON 4 MILLIGRAM(S): 8 TABLET, FILM COATED ORAL at 02:02

## 2018-01-24 RX ADMIN — MORPHINE SULFATE 4 MILLIGRAM(S): 50 CAPSULE, EXTENDED RELEASE ORAL at 02:02

## 2018-01-24 RX ADMIN — Medication 650 MILLIGRAM(S): at 23:45

## 2018-01-24 RX ADMIN — SODIUM CHLORIDE 120 MILLILITER(S): 9 INJECTION, SOLUTION INTRAVENOUS at 09:11

## 2018-01-24 RX ADMIN — HEPARIN SODIUM 5000 UNIT(S): 5000 INJECTION INTRAVENOUS; SUBCUTANEOUS at 07:20

## 2018-01-24 RX ADMIN — HEPARIN SODIUM 5000 UNIT(S): 5000 INJECTION INTRAVENOUS; SUBCUTANEOUS at 22:05

## 2018-01-24 NOTE — H&P ADULT - NSHPLABSRESULTS_GEN_ALL_CORE
10.3   4.4   )-----------( 283      ( 23 Jan 2018 19:39 )             32.1   01-23    140  |  104  |  12  ----------------------------<  102<H>  3.9   |  29  |  0.93    Ca    8.7      23 Jan 2018 19:39    TPro  6.5  /  Alb  3.7  /  TBili  <0.2  /  DBili  x   /  AST  19  /  ALT  11  /  AlkPhos  43  01-23  < from: CT Abdomen and Pelvis w/ Oral Cont and w/ IV Cont (01.23.18 @ 23:16) >    IMPRESSION:     1.   Possible early evolving small bowel obstruction possibly partial   versus delayed transit in the left upper quadrant. If there is clinical   concern for small bowel obstruction, recommend serial imaging and   surgical consultation.  2.  There is a 2.2 cm left renal hypodense lesion which likely represents   a hyperdense cyst. Recommend nonemergent renal ultrasound to confirm   cystic nature.    < end of copied text >

## 2018-01-24 NOTE — PROGRESS NOTE ADULT - ASSESSMENT
42yoF with PMH cholecystectomy and LSG March 2017 presents to the ED with abdominal pain and vomiting, partial SBO on CT    NPO  IVF/Banana bag  AM labs +nutrition eval  SQH, SCDs, OOBA, IS  Serial abdominal exams  Pain/nausea control

## 2018-01-24 NOTE — H&P ADULT - NSTOBACCOSCREENHP_GEN_A_NCS
No

## 2018-01-24 NOTE — PROGRESS NOTE ADULT - SUBJECTIVE AND OBJECTIVE BOX
SUBJECTIVE: Patient examined bedside complains of abdominal pain and nausea.  Flatus: [] YES [X NO             Bowel Movement: [ ] YES [X ] NO  Pain (0-10):            Pain Control Adequate: [X ] YES [ ] NO  Nausea: [ X] YES [ ] NO            Vomiting: [X ] YES [ ] NO  Diarrhea: [ ] YES [X ] NO         Constipation: [ ] YES [ X] NO     Chest Pain: [ ] YES [X ] NO    SOB:  [ ] YES [X ] NO    heparin  Injectable 5000 Unit(s) SubCutaneous every 8 hours      Vital Signs Last 24 Hrs  T(C): 36.4 (24 Jan 2018 05:30), Max: 36.9 (23 Jan 2018 18:28)  T(F): 97.5 (24 Jan 2018 05:30), Max: 98.4 (23 Jan 2018 18:28)  HR: 64 (24 Jan 2018 05:30) (64 - 78)  BP: 111/74 (24 Jan 2018 05:30) (111/74 - 135/99)  BP(mean): --  RR: 18 (24 Jan 2018 05:30) (17 - 18)  SpO2: 95% (24 Jan 2018 05:30) (95% - 100%)  I&O's Detail      General: NAD, resting comfortably in bed  C/V: NSR  Pulm: Nonlabored breathing, no respiratory distress  Abd: soft,  mildly distended, diffuse abdominal tenderness  Extrem: WWP, no edema, SCDs in place        LABS:                        9.8    4.2   )-----------( 252      ( 24 Jan 2018 06:34 )             30.8     01-24    139  |  104  |  9   ----------------------------<  84  4.2   |  26  |  0.91    Ca    8.4      24 Jan 2018 06:33  Phos  4.3     01-24  Mg     1.9     01-24    TPro  5.8<L>  /  Alb  3.2<L>  /  TBili  0.4  /  DBili  x   /  AST  282<H>  /  ALT  137<H>  /  AlkPhos  72  01-24      Urinalysis Basic - ( 23 Jan 2018 19:34 )    Color: Yellow / Appearance: Clear / SG: >=1.030 / pH: x  Gluc: x / Ketone: Trace mg/dL  / Bili: Negative / Urobili: 0.2 E.U./dL   Blood: x / Protein: 100 mg/dL / Nitrite: NEGATIVE   Leuk Esterase: NEGATIVE / RBC: 5-10 /HPF / WBC < 5 /HPF   Sq Epi: x / Non Sq Epi: 0-5 /HPF / Bacteria: Present /HPF        RADIOLOGY & ADDITIONAL STUDIES:

## 2018-01-24 NOTE — CONSULT NOTE ADULT - SUBJECTIVE AND OBJECTIVE BOX
HPI:  42yoF with PMH obesity s/p LSG March 2017 presents to the ED with 2-day history of abdominal pain. She describes the pain as crampy in the LUQ with some radiation to the back. Worsens with movement, improves with ginger ale and rest. Denies fevers, endorses chills. +nausea, NBNB emesis x3 yesterday, yellow and bitter. +flatus, +BM, last on Sunday. Usually has BMs every 3-4 days since surgery, take Dulcolax for constipation. Has lost 60lbs since surgery. No new foods, no recent travel. Has been taking Tylenol for pain. No NSAIDs, ASA, smoking.    PAST MEDICAL & SURGICAL HISTORY:  Hypertension  Vitamin D deficiency  Migraine headache  Knee pain  Morbid obesity  H/O gastric bypass  History of cholecystectomy      Vital Signs Last 24 Hrs  T(C): 36.9 (23 Jan 2018 18:28), Max: 36.9 (23 Jan 2018 18:28)  T(F): 98.4 (23 Jan 2018 18:28), Max: 98.4 (23 Jan 2018 18:28)  HR: 75 (23 Jan 2018 23:10) (75 - 78)  BP: 128/84 (23 Jan 2018 23:10) (128/84 - 135/99)  BP(mean): --  RR: 18 (23 Jan 2018 23:10) (18 - 18)  SpO2: 100% (23 Jan 2018 23:10) (100% - 100%)    PHYSICAL EXAM  Gen: NAD, resting comfortably in chair in ED. Well developed, well groomed, appears stated age  Neuro: CNII-XII grossly intact. AAOx3. Follows commands  HEENT: PERRL, EOMI, MMM  Pulm: CTAB, no respiratory distress, nonlabored breathing on RA  C/V: RRR, no MRG  Abd: Soft, ND, moderately tender to palpation in epigastric region, and less severely in LUQ and periumbilical region. Well healed surgical scars. +BS. No tympany, no rebound, no guarding.  Extrem: WWP, no edema, nontender. No cyanosis, pallor. Palpable radial, PT bilaterally  Skin: No rashes noted  Psych: normal affect, responds appropriately to questions      LABS:                        10.3   4.4   )-----------( 283      ( 23 Jan 2018 19:39 )             32.1     01-23    140  |  104  |  12  ----------------------------<  102<H>  3.9   |  29  |  0.93    Ca    8.7      23 Jan 2018 19:39    TPro  6.5  /  Alb  3.7  /  TBili  <0.2  /  DBili  x   /  AST  19  /  ALT  11  /  AlkPhos  43  01-23      Urinalysis Basic - ( 23 Jan 2018 19:34 )    Color: Yellow / Appearance: Clear / SG: >=1.030 / pH: x  Gluc: x / Ketone: Trace mg/dL  / Bili: Negative / Urobili: 0.2 E.U./dL   Blood: x / Protein: 100 mg/dL / Nitrite: NEGATIVE   Leuk Esterase: NEGATIVE / RBC: 5-10 /HPF / WBC < 5 /HPF   Sq Epi: x / Non Sq Epi: 0-5 /HPF / Bacteria: Present /HPF        RADIOLOGY & ADDITIONAL STUDIES:  < from: CT Abdomen and Pelvis w/ Oral Cont and w/ IV Cont (01.23.18 @ 23:16) >  IMPRESSION:     1.   Possible early evolving small bowel obstruction possibly partial   versus delayed transit in the left upper quadrant. If there is clinical   concern for small bowel obstruction, recommend serial imaging and   surgical consultation.  2.  There is a 2.2 cm left renal hypodense lesion which likely represents   a hyperdense cyst. Recommend nonemergent renal ultrasound to confirm   cystic nature.    < end of copied text >

## 2018-01-24 NOTE — H&P ADULT - NSHPPHYSICALEXAM_GEN_ALL_CORE
Gen: NAD, resting comfortably in chair in ED. Well developed, well groomed, appears stated age  Neuro: CNII-XII grossly intact. AAOx3. Follows commands  HEENT: PERRL, EOMI, MMM  Pulm: CTAB, no respiratory distress, nonlabored breathing on RA  C/V: RRR, no MRG  Abd: Soft, ND, moderately tender to palpation in epigastric region, and less severely in LUQ and periumbilical region. Well healed surgical scars. +BS. No tympany, no rebound, no guarding.  Extrem: WWP, no edema, nontender. No cyanosis, pallor. Palpable radial, PT bilaterally  Skin: No rashes noted  Psych: normal affect, responds appropriately to questions

## 2018-01-24 NOTE — H&P ADULT - HISTORY OF PRESENT ILLNESS
42yoF with PMH obesity s/p LSG March 2017 presents to the ED with 2-day history of abdominal pain. She describes the pain as crampy in the LUQ with some radiation to the back. Worsens with movement, improves with ginger ale and rest. Denies fevers, endorses chills. +nausea, NBNB emesis x3 yesterday, yellow and bitter. +flatus, +BM, last on Sunday. Usually has BMs every 3-4 days since surgery, take Dulcolax for constipation. Has lost 60lbs since surgery. No new foods, no recent travel. Has been taking Tylenol for pain. No NSAIDs, ASA, smoking.

## 2018-01-24 NOTE — CONSULT NOTE ADULT - ASSESSMENT
42yoF with PMH cholecystectomy and LSG March 2017 presents to the ED with abdominal pain and vomiting, partial SBO on CT    PO challenge  Further recommendations pending

## 2018-01-24 NOTE — H&P ADULT - ASSESSMENT
42yoF with PMH cholecystectomy and LSG March 2017 presents to the ED with abdominal pain and vomiting, partial SBO on CT    Admit to General Surgery Dr Gama  NPO  IVF/Banana bag  AM labs +nutrition eval  SQH, SCDs, OOBA, IS  Serial abdominal exams  Pain/nausea control

## 2018-01-25 ENCOUNTER — TRANSCRIPTION ENCOUNTER (OUTPATIENT)
Age: 43
End: 2018-01-25

## 2018-01-25 VITALS
TEMPERATURE: 98 F | DIASTOLIC BLOOD PRESSURE: 90 MMHG | OXYGEN SATURATION: 100 % | SYSTOLIC BLOOD PRESSURE: 152 MMHG | HEART RATE: 61 BPM | RESPIRATION RATE: 18 BRPM

## 2018-01-25 LAB
ANION GAP SERPL CALC-SCNC: 10 MMOL/L — SIGNIFICANT CHANGE UP (ref 5–17)
BUN SERPL-MCNC: 8 MG/DL — SIGNIFICANT CHANGE UP (ref 7–23)
CALCIUM SERPL-MCNC: 8.6 MG/DL — SIGNIFICANT CHANGE UP (ref 8.4–10.5)
CHLORIDE SERPL-SCNC: 102 MMOL/L — SIGNIFICANT CHANGE UP (ref 96–108)
CO2 SERPL-SCNC: 27 MMOL/L — SIGNIFICANT CHANGE UP (ref 22–31)
CREAT SERPL-MCNC: 0.86 MG/DL — SIGNIFICANT CHANGE UP (ref 0.5–1.3)
GLUCOSE SERPL-MCNC: 89 MG/DL — SIGNIFICANT CHANGE UP (ref 70–99)
HCT VFR BLD CALC: 33.5 % — LOW (ref 34.5–45)
HGB BLD-MCNC: 10.7 G/DL — LOW (ref 11.5–15.5)
MAGNESIUM SERPL-MCNC: 2.2 MG/DL — SIGNIFICANT CHANGE UP (ref 1.6–2.6)
MCHC RBC-ENTMCNC: 25.4 PG — LOW (ref 27–34)
MCHC RBC-ENTMCNC: 31.9 G/DL — LOW (ref 32–36)
MCV RBC AUTO: 79.6 FL — LOW (ref 80–100)
PHOSPHATE SERPL-MCNC: 4 MG/DL — SIGNIFICANT CHANGE UP (ref 2.5–4.5)
PLATELET # BLD AUTO: 281 K/UL — SIGNIFICANT CHANGE UP (ref 150–400)
POTASSIUM SERPL-MCNC: 3.8 MMOL/L — SIGNIFICANT CHANGE UP (ref 3.5–5.3)
POTASSIUM SERPL-SCNC: 3.8 MMOL/L — SIGNIFICANT CHANGE UP (ref 3.5–5.3)
RBC # BLD: 4.21 M/UL — SIGNIFICANT CHANGE UP (ref 3.8–5.2)
RBC # FLD: 14.8 % — SIGNIFICANT CHANGE UP (ref 10.3–16.9)
SODIUM SERPL-SCNC: 139 MMOL/L — SIGNIFICANT CHANGE UP (ref 135–145)
WBC # BLD: 3 K/UL — LOW (ref 3.8–10.5)
WBC # FLD AUTO: 3 K/UL — LOW (ref 3.8–10.5)

## 2018-01-25 RX ORDER — DOCUSATE SODIUM 100 MG
1 CAPSULE ORAL
Qty: 10 | Refills: 0 | OUTPATIENT
Start: 2018-01-25 | End: 2018-01-29

## 2018-01-25 RX ORDER — DOCUSATE SODIUM 100 MG
100 CAPSULE ORAL DAILY
Qty: 0 | Refills: 0 | Status: DISCONTINUED | OUTPATIENT
Start: 2018-01-25 | End: 2018-01-25

## 2018-01-25 RX ORDER — ACETAMINOPHEN 500 MG
650 TABLET ORAL ONCE
Qty: 0 | Refills: 0 | Status: COMPLETED | OUTPATIENT
Start: 2018-01-25 | End: 2018-01-25

## 2018-01-25 RX ADMIN — Medication 650 MILLIGRAM(S): at 10:18

## 2018-01-25 RX ADMIN — Medication 650 MILLIGRAM(S): at 09:18

## 2018-01-25 RX ADMIN — HEPARIN SODIUM 5000 UNIT(S): 5000 INJECTION INTRAVENOUS; SUBCUTANEOUS at 05:12

## 2018-01-25 RX ADMIN — Medication 100 MILLIGRAM(S): at 08:30

## 2018-01-25 RX ADMIN — HEPARIN SODIUM 5000 UNIT(S): 5000 INJECTION INTRAVENOUS; SUBCUTANEOUS at 14:25

## 2018-01-25 NOTE — PROGRESS NOTE ADULT - SUBJECTIVE AND OBJECTIVE BOX
INTERVAL HPI/OVERNIGHT EVENTS:      SUBJECTIVE: Pt seen and examined at bedside. No acute complaints. Passing flatus. No BM Pain resolved.   Flatus: [x ] YES [ ] NO             Bowel Movement: [ ] YES [x ] NO  Pain (0-10):            Pain Control Adequate: [ ] YES [x ] NO  Nausea: [ ] YES [x ] NO            Vomiting: [ ] YES [ x] NO  Diarrhea: [ ] YES [x ] NO         Constipation: [ ] YES [x ] NO     Chest Pain: [ ] YES [x ] NO    SOB:  [ ] YES [ x] NO    MEDICATIONS  (STANDING):  heparin  Injectable 5000 Unit(s) SubCutaneous every 8 hours  sodium chloride 0.9% 1000 milliLiter(s) (120 mL/Hr) IV Continuous <Continuous>    MEDICATIONS  (PRN):  ondansetron Injectable 4 milliGRAM(s) IV Push every 6 hours PRN Nausea      Vital Signs Last 24 Hrs  T(C): 37.1 (25 Jan 2018 04:35), Max: 37.5 (24 Jan 2018 20:22)  T(F): 98.8 (25 Jan 2018 04:35), Max: 99.5 (24 Jan 2018 20:22)  HR: 70 (25 Jan 2018 04:35) (60 - 70)  BP: 106/71 (25 Jan 2018 04:35) (106/71 - 138/80)  BP(mean): --  RR: 17 (25 Jan 2018 04:35) (17 - 18)  SpO2: 99% (25 Jan 2018 04:35) (99% - 100%)    PHYSICAL EXAM:      Constitutional: A&Ox3    Respiratory: non labored breathing, no respiratory distress    Cardiovascular: RRR    Gastrointestinal: Soft, ND, NT    Extremities: (-) edema                  I&O's Detail    24 Jan 2018 07:01  -  25 Jan 2018 06:26  --------------------------------------------------------  IN:  Total IN: 0 mL    OUT:    Voided: 575 mL  Total OUT: 575 mL    Total NET: -575 mL          LABS:                        9.8    4.2   )-----------( 252      ( 24 Jan 2018 06:34 )             30.8     01-24    139  |  104  |  9   ----------------------------<  84  4.2   |  26  |  0.91    Ca    8.4      24 Jan 2018 06:33  Phos  4.3     01-24  Mg     1.9     01-24    TPro  5.8<L>  /  Alb  3.2<L>  /  TBili  0.4  /  DBili  x   /  AST  282<H>  /  ALT  137<H>  /  AlkPhos  72  01-24      Urinalysis Basic - ( 23 Jan 2018 19:34 )    Color: Yellow / Appearance: Clear / SG: >=1.030 / pH: x  Gluc: x / Ketone: Trace mg/dL  / Bili: Negative / Urobili: 0.2 E.U./dL   Blood: x / Protein: 100 mg/dL / Nitrite: NEGATIVE   Leuk Esterase: NEGATIVE / RBC: 5-10 /HPF / WBC < 5 /HPF   Sq Epi: x / Non Sq Epi: 0-5 /HPF / Bacteria: Present /HPF        RADIOLOGY & ADDITIONAL STUDIES:

## 2018-01-25 NOTE — DISCHARGE NOTE ADULT - CARE PLAN
Principal Discharge DX:	Bowel obstruction  Goal:	to recover  Assessment and plan of treatment:	Follow up with Dr. Gama in 1 week. Call the office at  to schedule your appointment. You may shower; soap and water over incision sites. Do not scrub. Pat dry when done. No tub bathing or swimming until cleared. Keep incision sites out of the sun as scars will darken. No heavy lifting (>10lbs) or strenuous exercise. Diet: Bariatric Full Fluids. 60 grams protein daily.  64 fluid ounces water daily. Drink small sips throughout the day. Continue diet as outlined by paperwork received as a pre-operative patient. You should be urinating at least 3-4x per day. Call the office if you experience increasing abdominal pain, nausea, vomiting, or temperature >100.4F.  NO ASPIRIN OR NSAIDs until approved by Dr. Gama. Avoid alcoholic beverages until cleared by Dr. aGma. Principal Discharge DX:	Bowel obstruction  Goal:	to recovery  Assessment and plan of treatment:	-Continue clear liquid diet and advance as tolerated   -Notify physician for fever greater than 101, worsening abdominal pain, nausea and vomiting   -Follow-up with Dr. Gama in 1 week. Call the office to make an appointment.

## 2018-01-25 NOTE — DISCHARGE NOTE ADULT - MEDICATION SUMMARY - MEDICATIONS TO TAKE
I will START or STAY ON the medications listed below when I get home from the hospital:  None I will START or STAY ON the medications listed below when I get home from the hospital:    Colace 100 mg oral capsule  -- 1 cap(s) by mouth 2 times a day, As Needed -for constipation MDD:2   -- Medication should be taken with plenty of water.    -- Indication: For Constipation

## 2018-01-25 NOTE — DISCHARGE NOTE ADULT - PLAN OF CARE
to recover Follow up with Dr. Gama in 1 week. Call the office at  to schedule your appointment. You may shower; soap and water over incision sites. Do not scrub. Pat dry when done. No tub bathing or swimming until cleared. Keep incision sites out of the sun as scars will darken. No heavy lifting (>10lbs) or strenuous exercise. Diet: Bariatric Full Fluids. 60 grams protein daily.  64 fluid ounces water daily. Drink small sips throughout the day. Continue diet as outlined by paperwork received as a pre-operative patient. You should be urinating at least 3-4x per day. Call the office if you experience increasing abdominal pain, nausea, vomiting, or temperature >100.4F.  NO ASPIRIN OR NSAIDs until approved by Dr. Gama. Avoid alcoholic beverages until cleared by Dr. Gama. to recovery -Continue clear liquid diet and advance as tolerated   -Notify physician for fever greater than 101, worsening abdominal pain, nausea and vomiting   -Follow-up with Dr. Gama in 1 week. Call the office to make an appointment.

## 2018-01-25 NOTE — DISCHARGE NOTE ADULT - HOSPITAL COURSE
42yoF with PMH obesity s/p LSG March 2017 presents to the ED with 2-day history of abdominal pain. She described the pain as crampy in the LUQ with some radiation to the back. Worsens with movement, improved with ginger ale and rest. She denied fevers, endorses chills. Patient was nausea, NBNB emesis x3 the day prior, with positive flatus, positive BM, however the last one being on Sunday. Patients usually course is BMs every 3-4 days since surgery. Patient was given pain meds and bowel rest.  A CT showed early evolving SBO possibly partial versus delayed transit in the left upper quadrant.  It was also noted that the patient had a 2.2 cm left renal hypodense lesion which likely represents a hyperdense cyst. An upper GI study showed the outlined small bowl loops were normal in diameter without evidence of bowel obstruction. Patient's clinical condition improved during hospital stay. Patient was hemodynamically stable, afebrile, and with a plan for followup upon discharge. 42yoF with PMH obesity s/p LSG March 2017 presents to the ED with 2-day history of abdominal pain. She described the pain as crampy in the LUQ with some radiation to the back. Worsens with movement, improved with ginger ale and rest. She denied fevers, endorses chills. Patient was nausea, NBNB emesis x3 the day prior, with positive flatus, positive BM, however the last one being on Sunday. Patients usually course is BMs every 3-4 days since surgery. Patient was given pain meds and bowel rest.  A CT showed early evolving SBO possibly partial versus delayed transit in the left upper quadrant.  It was also noted that the patient had a 2.2 cm left renal hypodense lesion which likely represents a hyperdense cyst. An upper GI study showed the outlined small bowl loops were normal in diameter without evidence of bowel obstruction. Patient's clinical condition improved during hospital stay. Patient tolerating diet  and nausea and vomiting resolve at the time of discharge Patient was hemodynamically stable, afebrile, and with a plan for followup upon discharge.

## 2018-01-25 NOTE — DISCHARGE NOTE ADULT - CARE PROVIDER_API CALL
Melvin Gama (MD), Surgery  [address] [place] [address]  Phone: [phone]  Fax: [phone]

## 2018-01-25 NOTE — PROGRESS NOTE ADULT - ASSESSMENT
42yoF with PMH cholecystectomy and LSG March 2017 presents to the ED with abdominal pain and vomiting, partial SBO on CT, now passing flatus and tolerating diet, free from abd pain.       Regular diet  SQH, SCDs, OCLIVE, IS  Pain/nausea control  Dc home

## 2018-01-25 NOTE — DISCHARGE NOTE ADULT - PATIENT PORTAL LINK FT
“You can access the [facility] Patient Portal, offered by [facility], by registering with the following website: [email or web address]”

## 2018-01-27 LAB — VIT B1 SERPL-MCNC: 79.5 NMOL/L — SIGNIFICANT CHANGE UP (ref 66.5–200)

## 2018-01-28 LAB — PYRIDOXAL PHOS SERPL-MCNC: 12.3 UG/L — SIGNIFICANT CHANGE UP (ref 2–32.8)

## 2018-01-30 DIAGNOSIS — G43.909 MIGRAINE, UNSPECIFIED, NOT INTRACTABLE, WITHOUT STATUS MIGRAINOSUS: ICD-10-CM

## 2018-01-30 DIAGNOSIS — N28.1 CYST OF KIDNEY, ACQUIRED: ICD-10-CM

## 2018-01-30 DIAGNOSIS — E55.9 VITAMIN D DEFICIENCY, UNSPECIFIED: ICD-10-CM

## 2018-01-30 DIAGNOSIS — I10 ESSENTIAL (PRIMARY) HYPERTENSION: ICD-10-CM

## 2018-01-30 DIAGNOSIS — R10.12 LEFT UPPER QUADRANT PAIN: ICD-10-CM

## 2018-01-30 DIAGNOSIS — K56.600 PARTIAL INTESTINAL OBSTRUCTION, UNSPECIFIED AS TO CAUSE: ICD-10-CM

## 2018-01-30 DIAGNOSIS — Z98.84 BARIATRIC SURGERY STATUS: ICD-10-CM

## 2018-01-30 DIAGNOSIS — E66.9 OBESITY, UNSPECIFIED: ICD-10-CM

## 2018-01-31 ENCOUNTER — APPOINTMENT (OUTPATIENT)
Dept: BARIATRICS | Facility: CLINIC | Age: 43
End: 2018-01-31
Payer: MEDICAID

## 2018-01-31 VITALS
DIASTOLIC BLOOD PRESSURE: 82 MMHG | TEMPERATURE: 97.7 F | SYSTOLIC BLOOD PRESSURE: 136 MMHG | BODY MASS INDEX: 30.16 KG/M2 | HEART RATE: 66 BPM | OXYGEN SATURATION: 100 % | WEIGHT: 181 LBS | HEIGHT: 65 IN

## 2018-01-31 DIAGNOSIS — R10.9 UNSPECIFIED ABDOMINAL PAIN: ICD-10-CM

## 2018-01-31 PROCEDURE — 99213 OFFICE O/P EST LOW 20 MIN: CPT

## 2018-03-14 ENCOUNTER — APPOINTMENT (OUTPATIENT)
Dept: BARIATRICS | Facility: CLINIC | Age: 43
End: 2018-03-14
Payer: MEDICAID

## 2018-03-14 VITALS
BODY MASS INDEX: 29.49 KG/M2 | SYSTOLIC BLOOD PRESSURE: 114 MMHG | OXYGEN SATURATION: 99 % | HEIGHT: 65 IN | TEMPERATURE: 98.1 F | HEART RATE: 80 BPM | WEIGHT: 177 LBS | DIASTOLIC BLOOD PRESSURE: 75 MMHG

## 2018-03-14 LAB
25(OH)D3 SERPL-MCNC: 30.4 NG/ML
ALBUMIN SERPL ELPH-MCNC: 3.8 G/DL
ALP BLD-CCNC: 52 U/L
ALT SERPL-CCNC: 12 U/L
ANION GAP SERPL CALC-SCNC: 11 MMOL/L
AST SERPL-CCNC: 15 U/L
BASOPHILS # BLD AUTO: 0.01 K/UL
BASOPHILS NFR BLD AUTO: 0.3 %
BILIRUB SERPL-MCNC: 0.4 MG/DL
BUN SERPL-MCNC: 15 MG/DL
CA-I SERPL-SCNC: 1.23 MMOL/L
CALCIUM SERPL-MCNC: 9.5 MG/DL
CALCIUM SERPL-MCNC: 9.5 MG/DL
CHLORIDE SERPL-SCNC: 109 MMOL/L
CHOLEST SERPL-MCNC: 301 MG/DL
CHOLEST/HDLC SERPL: 3.2 RATIO
CO2 SERPL-SCNC: 25 MMOL/L
CREAT SERPL-MCNC: 1.1 MG/DL
EOSINOPHIL # BLD AUTO: 0.02 K/UL
EOSINOPHIL NFR BLD AUTO: 0.6 %
FOLATE SERPL-MCNC: 17 NG/ML
GLUCOSE SERPL-MCNC: 88 MG/DL
HBA1C MFR BLD HPLC: 5.4 %
HCT VFR BLD CALC: 35.7 %
HDLC SERPL-MCNC: 93 MG/DL
HGB BLD-MCNC: 11 G/DL
IMM GRANULOCYTES NFR BLD AUTO: 0 %
IRON SATN MFR SERPL: 93 %
IRON SERPL-MCNC: 272 UG/DL
LDLC SERPL CALC-MCNC: 190 MG/DL
LYMPHOCYTES # BLD AUTO: 1.57 K/UL
LYMPHOCYTES NFR BLD AUTO: 50.2 %
MAN DIFF?: NORMAL
MCHC RBC-ENTMCNC: 25.8 PG
MCHC RBC-ENTMCNC: 30.8 GM/DL
MCV RBC AUTO: 83.6 FL
MONOCYTES # BLD AUTO: 0.23 K/UL
MONOCYTES NFR BLD AUTO: 7.3 %
NEUTROPHILS # BLD AUTO: 1.3 K/UL
NEUTROPHILS NFR BLD AUTO: 41.6 %
PARATHYROID HORMONE INTACT: 39 PG/ML
PLATELET # BLD AUTO: 276 K/UL
POTASSIUM SERPL-SCNC: 5 MMOL/L
PREALB SERPL NEPH-MCNC: 22 MG/DL
PROT SERPL-MCNC: 6.5 G/DL
RBC # BLD: 4.27 M/UL
RBC # FLD: 15.1 %
SODIUM SERPL-SCNC: 145 MMOL/L
TIBC SERPL-MCNC: 291 UG/DL
TRIGL SERPL-MCNC: 90 MG/DL
TSH SERPL-ACNC: 0.18 UIU/ML
UIBC SERPL-MCNC: 19 UG/DL
VIT B12 SERPL-MCNC: 664 PG/ML
WBC # FLD AUTO: 3.13 K/UL

## 2018-03-14 PROCEDURE — 99213 OFFICE O/P EST LOW 20 MIN: CPT

## 2018-03-16 LAB — ZINC SERPL-MCNC: 89 UG/DL

## 2018-03-17 LAB
A-TOCOPHEROL VIT E SERPL-MCNC: 18.9 MG/L
BETA+GAMMA TOCOPHEROL SERPL-MCNC: <1 MG/L
VIT A SERPL-MCNC: 53 UG/DL
VIT B1 SERPL-MCNC: 87.6 NMOL/L

## 2018-03-19 ENCOUNTER — OTHER (OUTPATIENT)
Age: 43
End: 2018-03-19

## 2018-03-20 ENCOUNTER — LABORATORY RESULT (OUTPATIENT)
Age: 43
End: 2018-03-20

## 2018-03-26 LAB
BASOPHILS # BLD AUTO: 0.01 K/UL
BASOPHILS NFR BLD AUTO: 0.3 %
EOSINOPHIL # BLD AUTO: 0.04 K/UL
EOSINOPHIL NFR BLD AUTO: 1 %
HCT VFR BLD CALC: 34.2 %
HGB BLD-MCNC: 10.7 G/DL
IMM GRANULOCYTES NFR BLD AUTO: 0.3 %
LYMPHOCYTES # BLD AUTO: 1.77 K/UL
LYMPHOCYTES NFR BLD AUTO: 44.3 %
MAN DIFF?: NORMAL
MCHC RBC-ENTMCNC: 26.1 PG
MCHC RBC-ENTMCNC: 31.3 GM/DL
MCV RBC AUTO: 83.4 FL
MONOCYTES # BLD AUTO: 0.31 K/UL
MONOCYTES NFR BLD AUTO: 7.8 %
NEUTROPHILS # BLD AUTO: 1.86 K/UL
NEUTROPHILS NFR BLD AUTO: 46.3 %
PLATELET # BLD AUTO: 272 K/UL
RBC # BLD: 4.1 M/UL
RBC # FLD: 15.3 %
TSH SERPL-ACNC: 0.13 UIU/ML
WBC # FLD AUTO: 4 K/UL

## 2018-04-11 ENCOUNTER — APPOINTMENT (OUTPATIENT)
Dept: HEMATOLOGY ONCOLOGY | Facility: CLINIC | Age: 43
End: 2018-04-11
Payer: MEDICAID

## 2018-04-11 VITALS
HEIGHT: 65 IN | TEMPERATURE: 98.5 F | BODY MASS INDEX: 29.66 KG/M2 | SYSTOLIC BLOOD PRESSURE: 112 MMHG | WEIGHT: 178 LBS | DIASTOLIC BLOOD PRESSURE: 81 MMHG

## 2018-04-11 PROCEDURE — 36430 TRANSFUSION BLD/BLD COMPNT: CPT

## 2018-04-11 PROCEDURE — 99243 OFF/OP CNSLTJ NEW/EST LOW 30: CPT | Mod: 25

## 2018-04-17 LAB
BASOPHILS # BLD AUTO: 0.01 K/UL
BASOPHILS NFR BLD AUTO: 0.3 %
EOSINOPHIL # BLD AUTO: 0.01 K/UL
EOSINOPHIL NFR BLD AUTO: 0.3 %
ERYTHROCYTE [SEDIMENTATION RATE] IN BLOOD BY WESTERGREN METHOD: 32 MM/HR
FERRITIN SERPL-MCNC: 7 NG/ML
HAPTOGLOB SERPL-MCNC: 94 MG/DL
HCT VFR BLD CALC: 33.8 %
HGB BLD-MCNC: 10.5 G/DL
IMM GRANULOCYTES NFR BLD AUTO: 0 %
IRON SATN MFR SERPL: 15 %
IRON SERPL-MCNC: 47 UG/DL
LDH SERPL-CCNC: 223 U/L
LYMPHOCYTES # BLD AUTO: 1.48 K/UL
LYMPHOCYTES NFR BLD AUTO: 42.3 %
MAN DIFF?: NORMAL
MCHC RBC-ENTMCNC: 25.2 PG
MCHC RBC-ENTMCNC: 31.1 GM/DL
MCV RBC AUTO: 81.3 FL
MONOCYTES # BLD AUTO: 0.26 K/UL
MONOCYTES NFR BLD AUTO: 7.4 %
NEUTROPHILS # BLD AUTO: 1.74 K/UL
NEUTROPHILS NFR BLD AUTO: 49.7 %
PLATELET # BLD AUTO: 271 K/UL
RBC # BLD: 4.16 M/UL
RBC # FLD: 15 %
TIBC SERPL-MCNC: 317 UG/DL
TM INTERPRETATION: NORMAL
UIBC SERPL-MCNC: 270 UG/DL
WBC # FLD AUTO: 3.5 K/UL

## 2018-05-13 DIAGNOSIS — E46 UNSPECIFIED PROTEIN-CALORIE MALNUTRITION: ICD-10-CM

## 2018-05-23 PROCEDURE — 82728 ASSAY OF FERRITIN: CPT

## 2018-05-23 PROCEDURE — 74177 CT ABD & PELVIS W/CONTRAST: CPT

## 2018-05-23 PROCEDURE — 83690 ASSAY OF LIPASE: CPT

## 2018-05-23 PROCEDURE — 96376 TX/PRO/DX INJ SAME DRUG ADON: CPT

## 2018-05-23 PROCEDURE — 96374 THER/PROPH/DIAG INJ IV PUSH: CPT | Mod: XU

## 2018-05-23 PROCEDURE — 82746 ASSAY OF FOLIC ACID SERUM: CPT

## 2018-05-23 PROCEDURE — 85027 COMPLETE CBC AUTOMATED: CPT

## 2018-05-23 PROCEDURE — 93005 ELECTROCARDIOGRAM TRACING: CPT

## 2018-05-23 PROCEDURE — 80048 BASIC METABOLIC PNL TOTAL CA: CPT

## 2018-05-23 PROCEDURE — 84425 ASSAY OF VITAMIN B-1: CPT

## 2018-05-23 PROCEDURE — 99285 EMERGENCY DEPT VISIT HI MDM: CPT | Mod: 25

## 2018-05-23 PROCEDURE — 84100 ASSAY OF PHOSPHORUS: CPT

## 2018-05-23 PROCEDURE — 96375 TX/PRO/DX INJ NEW DRUG ADDON: CPT

## 2018-05-23 PROCEDURE — 84466 ASSAY OF TRANSFERRIN: CPT

## 2018-05-23 PROCEDURE — 82607 VITAMIN B-12: CPT

## 2018-05-23 PROCEDURE — 84134 ASSAY OF PREALBUMIN: CPT

## 2018-05-23 PROCEDURE — 85025 COMPLETE CBC W/AUTO DIFF WBC: CPT

## 2018-05-23 PROCEDURE — 84207 ASSAY OF VITAMIN B-6: CPT

## 2018-05-23 PROCEDURE — 81001 URINALYSIS AUTO W/SCOPE: CPT

## 2018-05-23 PROCEDURE — 80053 COMPREHEN METABOLIC PANEL: CPT

## 2018-05-23 PROCEDURE — 83735 ASSAY OF MAGNESIUM: CPT

## 2018-05-23 PROCEDURE — 74245: CPT

## 2018-05-23 PROCEDURE — 36415 COLL VENOUS BLD VENIPUNCTURE: CPT

## 2018-06-17 ENCOUNTER — FORM ENCOUNTER (OUTPATIENT)
Age: 43
End: 2018-06-17

## 2018-06-18 ENCOUNTER — OUTPATIENT (OUTPATIENT)
Dept: OUTPATIENT SERVICES | Facility: HOSPITAL | Age: 43
LOS: 1 days | End: 2018-06-18
Payer: MEDICAID

## 2018-06-18 ENCOUNTER — APPOINTMENT (OUTPATIENT)
Dept: ORTHOPEDIC SURGERY | Facility: CLINIC | Age: 43
End: 2018-06-18
Payer: MEDICAID

## 2018-06-18 VITALS
BODY MASS INDEX: 29.16 KG/M2 | HEIGHT: 65 IN | DIASTOLIC BLOOD PRESSURE: 70 MMHG | SYSTOLIC BLOOD PRESSURE: 100 MMHG | WEIGHT: 175 LBS

## 2018-06-18 DIAGNOSIS — M70.51 OTHER BURSITIS OF KNEE, RIGHT KNEE: ICD-10-CM

## 2018-06-18 DIAGNOSIS — Z90.49 ACQUIRED ABSENCE OF OTHER SPECIFIED PARTS OF DIGESTIVE TRACT: Chronic | ICD-10-CM

## 2018-06-18 DIAGNOSIS — M70.52 OTHER BURSITIS OF KNEE, RIGHT KNEE: ICD-10-CM

## 2018-06-18 DIAGNOSIS — Z90.3 ACQUIRED ABSENCE OF STOMACH [PART OF]: Chronic | ICD-10-CM

## 2018-06-18 DIAGNOSIS — M22.2X1 PATELLOFEMORAL DISORDERS, RIGHT KNEE: ICD-10-CM

## 2018-06-18 DIAGNOSIS — M22.2X2 PATELLOFEMORAL DISORDERS, RIGHT KNEE: ICD-10-CM

## 2018-06-18 PROBLEM — E46 CALORIC MALNUTRITION: Status: ACTIVE | Noted: 2018-06-18

## 2018-06-18 PROCEDURE — 99203 OFFICE O/P NEW LOW 30 MIN: CPT

## 2018-06-18 PROCEDURE — 73564 X-RAY EXAM KNEE 4 OR MORE: CPT | Mod: 26,50

## 2018-06-18 PROCEDURE — 73564 X-RAY EXAM KNEE 4 OR MORE: CPT

## 2018-09-12 ENCOUNTER — APPOINTMENT (OUTPATIENT)
Dept: BARIATRICS | Facility: CLINIC | Age: 43
End: 2018-09-12

## 2018-10-22 ENCOUNTER — APPOINTMENT (OUTPATIENT)
Dept: BARIATRICS | Facility: CLINIC | Age: 43
End: 2018-10-22
Payer: MEDICAID

## 2018-10-22 ENCOUNTER — APPOINTMENT (OUTPATIENT)
Dept: ORTHOPEDIC SURGERY | Facility: CLINIC | Age: 43
End: 2018-10-22

## 2018-10-22 VITALS
WEIGHT: 178.19 LBS | OXYGEN SATURATION: 100 % | HEART RATE: 71 BPM | SYSTOLIC BLOOD PRESSURE: 126 MMHG | BODY MASS INDEX: 29.69 KG/M2 | HEIGHT: 65 IN | TEMPERATURE: 97.5 F | DIASTOLIC BLOOD PRESSURE: 83 MMHG

## 2018-10-22 DIAGNOSIS — R79.89 OTHER SPECIFIED ABNORMAL FINDINGS OF BLOOD CHEMISTRY: ICD-10-CM

## 2018-10-22 PROCEDURE — 99213 OFFICE O/P EST LOW 20 MIN: CPT

## 2018-10-22 RX ORDER — ATORVASTATIN CALCIUM 20 MG/1
20 TABLET, FILM COATED ORAL
Refills: 0 | Status: DISCONTINUED | COMMUNITY

## 2018-10-22 RX ORDER — SUMATRIPTAN 50 MG/1
50 TABLET, FILM COATED ORAL
Refills: 0 | Status: DISCONTINUED | COMMUNITY

## 2018-10-22 RX ORDER — CHLORHEXIDINE GLUCONATE 4 %
325 (65 FE) LIQUID (ML) TOPICAL
Refills: 0 | Status: DISCONTINUED | COMMUNITY

## 2018-10-23 LAB
ALBUMIN SERPL ELPH-MCNC: 3.9 G/DL
ALP BLD-CCNC: 54 U/L
ALT SERPL-CCNC: 16 U/L
ANION GAP SERPL CALC-SCNC: 12 MMOL/L
AST SERPL-CCNC: 18 U/L
BILIRUB SERPL-MCNC: <0.2 MG/DL
BUN SERPL-MCNC: 19 MG/DL
CALCIUM SERPL-MCNC: 8.7 MG/DL
CHLORIDE SERPL-SCNC: 106 MMOL/L
CO2 SERPL-SCNC: 23 MMOL/L
CREAT SERPL-MCNC: 1.04 MG/DL
GLUCOSE SERPL-MCNC: 89 MG/DL
POTASSIUM SERPL-SCNC: 3.9 MMOL/L
PROT SERPL-MCNC: 6.4 G/DL
SODIUM SERPL-SCNC: 141 MMOL/L

## 2018-11-01 ENCOUNTER — APPOINTMENT (OUTPATIENT)
Dept: PLASTIC SURGERY | Facility: CLINIC | Age: 43
End: 2018-11-01
Payer: MEDICAID

## 2018-11-01 VITALS — BODY MASS INDEX: 29.32 KG/M2 | WEIGHT: 176 LBS | HEIGHT: 65 IN

## 2018-11-01 PROCEDURE — 99203 OFFICE O/P NEW LOW 30 MIN: CPT

## 2018-11-06 LAB
25(OH)D3 SERPL-MCNC: 25.7 NG/ML
A-TOCOPHEROL VIT E SERPL-MCNC: 17.4 MG/L
BASOPHILS # BLD AUTO: 0.01 K/UL
BASOPHILS NFR BLD AUTO: 0.3 %
BETA+GAMMA TOCOPHEROL SERPL-MCNC: 1.4 MG/L
CA-I SERPL-SCNC: 1.2 MMOL/L
CALCIUM SERPL-MCNC: 8.7 MG/DL
CHOLEST SERPL-MCNC: 282 MG/DL
CHOLEST/HDLC SERPL: 3.2 RATIO
EOSINOPHIL # BLD AUTO: 0.04 K/UL
EOSINOPHIL NFR BLD AUTO: 1.2 %
FOLATE SERPL-MCNC: >20 NG/ML
HBA1C MFR BLD HPLC: 5.3 %
HCT VFR BLD CALC: 35.2 %
HDLC SERPL-MCNC: 88 MG/DL
HGB BLD-MCNC: 11.4 G/DL
IMM GRANULOCYTES NFR BLD AUTO: 0 %
IRON SATN MFR SERPL: 58 %
IRON SERPL-MCNC: 151 UG/DL
LDLC SERPL CALC-MCNC: 179 MG/DL
LYMPHOCYTES # BLD AUTO: 1.63 K/UL
LYMPHOCYTES NFR BLD AUTO: 48.8 %
MAN DIFF?: NORMAL
MCHC RBC-ENTMCNC: 25.4 PG
MCHC RBC-ENTMCNC: 32.4 GM/DL
MCV RBC AUTO: 78.4 FL
MONOCYTES # BLD AUTO: 0.2 K/UL
MONOCYTES NFR BLD AUTO: 6 %
NEUTROPHILS # BLD AUTO: 1.46 K/UL
NEUTROPHILS NFR BLD AUTO: 43.7 %
PARATHYROID HORMONE INTACT: 41 PG/ML
PLATELET # BLD AUTO: 284 K/UL
PREALB SERPL NEPH-MCNC: 23 MG/DL
RBC # BLD: 4.49 M/UL
RBC # FLD: 17.4 %
TIBC SERPL-MCNC: 259 UG/DL
TRIGL SERPL-MCNC: 75 MG/DL
TSH SERPL-ACNC: 1.95 UIU/ML
UIBC SERPL-MCNC: 108 UG/DL
VIT A SERPL-MCNC: 44.5 UG/DL
VIT B1 SERPL-MCNC: 142.4 NMOL/L
VIT B12 SERPL-MCNC: 1199 PG/ML
WBC # FLD AUTO: 3.34 K/UL
ZINC SERPL-MCNC: 80 UG/DL

## 2018-11-29 ENCOUNTER — APPOINTMENT (OUTPATIENT)
Dept: UROLOGY | Facility: CLINIC | Age: 43
End: 2018-11-29
Payer: MEDICAID

## 2018-11-29 VITALS
WEIGHT: 176 LBS | HEIGHT: 65 IN | DIASTOLIC BLOOD PRESSURE: 86 MMHG | TEMPERATURE: 99.3 F | SYSTOLIC BLOOD PRESSURE: 142 MMHG | HEART RATE: 67 BPM | BODY MASS INDEX: 29.32 KG/M2

## 2018-11-29 PROCEDURE — 99204 OFFICE O/P NEW MOD 45 MIN: CPT

## 2018-11-30 LAB
APPEARANCE: CLEAR
BACTERIA: ABNORMAL
BILIRUBIN URINE: NEGATIVE
BLOOD URINE: ABNORMAL
COLOR: YELLOW
GLUCOSE QUALITATIVE U: NEGATIVE MG/DL
HYALINE CASTS: 4 /LPF
KETONES URINE: NEGATIVE
LEUKOCYTE ESTERASE URINE: NEGATIVE
MICROSCOPIC-UA: NORMAL
NITRITE URINE: NEGATIVE
PH URINE: 7
PROTEIN URINE: 100 MG/DL
RED BLOOD CELLS URINE: 39 /HPF
SPECIFIC GRAVITY URINE: 1.02
SQUAMOUS EPITHELIAL CELLS: 7 /HPF
UROBILINOGEN URINE: NEGATIVE MG/DL
WHITE BLOOD CELLS URINE: 5 /HPF

## 2018-12-04 LAB — BACTERIA UR CULT: ABNORMAL

## 2018-12-05 LAB — CORE LAB NON GYN CYTOLOGY TO LENOX HILL: NORMAL

## 2018-12-06 ENCOUNTER — APPOINTMENT (OUTPATIENT)
Dept: UROLOGY | Facility: CLINIC | Age: 43
End: 2018-12-06
Payer: MEDICAID

## 2018-12-06 ENCOUNTER — RESULT REVIEW (OUTPATIENT)
Age: 43
End: 2018-12-06

## 2018-12-06 DIAGNOSIS — Z87.440 PERSONAL HISTORY OF URINARY (TRACT) INFECTIONS: ICD-10-CM

## 2018-12-06 PROCEDURE — 52000 CYSTOURETHROSCOPY: CPT

## 2019-01-02 ENCOUNTER — RESULT REVIEW (OUTPATIENT)
Age: 44
End: 2019-01-02

## 2019-01-02 LAB — BACTERIA UR CULT: NORMAL

## 2019-01-04 ENCOUNTER — APPOINTMENT (OUTPATIENT)
Dept: NEPHROLOGY | Facility: CLINIC | Age: 44
End: 2019-01-04
Payer: MEDICAID

## 2019-01-04 VITALS — DIASTOLIC BLOOD PRESSURE: 75 MMHG | SYSTOLIC BLOOD PRESSURE: 110 MMHG | HEART RATE: 70 BPM | RESPIRATION RATE: 14 BRPM

## 2019-01-04 PROCEDURE — 99204 OFFICE O/P NEW MOD 45 MIN: CPT

## 2019-01-04 RX ORDER — NITROFURANTOIN (MONOHYDRATE/MACROCRYSTALS) 25; 75 MG/1; MG/1
100 CAPSULE ORAL TWICE DAILY
Qty: 28 | Refills: 0 | Status: DISCONTINUED | COMMUNITY
Start: 2018-12-06 | End: 2019-01-04

## 2019-01-08 NOTE — ASSESSMENT
[FreeTextEntry1] : needs update\par \par Mild leukopenia, anemia, stable over last 2 years. Seeing a hematologist at [facility] but doesn't remember name. doesn't know diagnoses, has f/u tyrell next month. Never had bone marrow biopsy. \par check paraproteins\par \par Microscopic hematuria - may be 2/2 asymp renal stone, although past CTs didn't show stones. Check renal sono. REviewed Dr Hart's work up, Ecoli uti s/p treatment and repeat negative culture. Urine cytology negative. \par \par \par HTN well controlled since bariatric surgery, diet/lifestyle managed.

## 2019-01-08 NOTE — HISTORY OF PRESENT ILLNESS
[FreeTextEntry1] : 42yo AA F s/p bariatric surgery 2015 wth HTN and h/o nephrolithiasis in remote past referred by PCP Dr. Baugh for evaluation of microscopic hematuria: \miriam \miriam First in 2016 (once), then reoccurred in Oct 2018, microscopic hematuria on routine analysis with PCP. Never seen gross hematuria, no urinary sx, no dysuria. She thinks she's had a kidney stone "a long time ago" she had flank pain, told she had a stone and to drink a  lot of water, passed it (visualized but lost in toilet). \miriam Has mild occ lower back pain, non localizing, never experienced pain in flank similar to episode of renal stone years ago. \par \par HTN dx around 16yrs old. Now well controlled per pt. MIgraine headaches occ. \par \miriam Uses diclofenac 1-3x/week for migraines, have been better lately, maybe only once/week. Rare tylenol. No other herbal products. \par \miriam Saw A nephrologist for "mild kidney problems", last year but never folllowed up. Possibly dr Zacarias. \par  2.2cm L hypodense mass in kidney. never smoker. CT 2016 confimrs no enhancing mass. no family history prostate bladder kidney. Follows with Dr. Weinstein\miriam Fam Hx: all her family with HTN, no other medical problem. \miriam Works as a [employer] Has two children here. From [place] origin[place]. No etoh/drugs

## 2019-01-10 ENCOUNTER — APPOINTMENT (OUTPATIENT)
Dept: ENDOCRINOLOGY | Facility: CLINIC | Age: 44
End: 2019-01-10
Payer: MEDICAID

## 2019-01-10 VITALS
WEIGHT: 181 LBS | HEART RATE: 64 BPM | HEIGHT: 65 IN | DIASTOLIC BLOOD PRESSURE: 77 MMHG | BODY MASS INDEX: 30.16 KG/M2 | SYSTOLIC BLOOD PRESSURE: 125 MMHG

## 2019-01-10 DIAGNOSIS — R94.6 ABNORMAL RESULTS OF THYROID FUNCTION STUDIES: ICD-10-CM

## 2019-01-10 PROCEDURE — 99204 OFFICE O/P NEW MOD 45 MIN: CPT

## 2019-01-11 LAB
PROLACTIN SERPL-MCNC: 14.3 NG/ML
T3 SERPL-MCNC: 106 NG/DL
T4 FREE SERPL-MCNC: 0.8 NG/DL
T4 SERPL-MCNC: 5.1 UG/DL
TSH SERPL-ACNC: 0.99 UIU/ML

## 2019-01-14 ENCOUNTER — APPOINTMENT (OUTPATIENT)
Dept: HEMATOLOGY ONCOLOGY | Facility: CLINIC | Age: 44
End: 2019-01-14
Payer: MEDICAID

## 2019-01-14 ENCOUNTER — APPOINTMENT (OUTPATIENT)
Dept: PLASTIC SURGERY | Facility: HOSPITAL | Age: 44
End: 2019-01-14

## 2019-01-14 VITALS
SYSTOLIC BLOOD PRESSURE: 105 MMHG | RESPIRATION RATE: 14 BRPM | HEART RATE: 79 BPM | OXYGEN SATURATION: 100 % | DIASTOLIC BLOOD PRESSURE: 72 MMHG | TEMPERATURE: 98.3 F

## 2019-01-14 VITALS — HEIGHT: 60 IN | WEIGHT: 182 LBS | BODY MASS INDEX: 35.73 KG/M2

## 2019-01-14 LAB — T4BG SERPL-MCNC: 18 UG/ML

## 2019-01-14 PROCEDURE — 99213 OFFICE O/P EST LOW 20 MIN: CPT | Mod: 25

## 2019-01-14 PROCEDURE — 36415 COLL VENOUS BLD VENIPUNCTURE: CPT

## 2019-01-15 LAB
ALBUMIN SERPL ELPH-MCNC: 3.7 G/DL
ALP BLD-CCNC: 43 U/L
ALT SERPL-CCNC: 10 U/L
ANION GAP SERPL CALC-SCNC: 10 MMOL/L
AST SERPL-CCNC: 19 U/L
BASOPHILS # BLD AUTO: 0.01 K/UL
BASOPHILS NFR BLD AUTO: 0.3 %
BILIRUB SERPL-MCNC: 0.2 MG/DL
BUN SERPL-MCNC: 15 MG/DL
CALCIUM SERPL-MCNC: 8.9 MG/DL
CHLORIDE SERPL-SCNC: 105 MMOL/L
CO2 SERPL-SCNC: 25 MMOL/L
CREAT SERPL-MCNC: 0.92 MG/DL
EOSINOPHIL # BLD AUTO: 0.02 K/UL
EOSINOPHIL NFR BLD AUTO: 0.6 %
ERYTHROCYTE [SEDIMENTATION RATE] IN BLOOD BY WESTERGREN METHOD: 22 MM/HR
FERRITIN SERPL-MCNC: 13 NG/ML
GLUCOSE SERPL-MCNC: 83 MG/DL
HAPTOGLOB SERPL-MCNC: 86 MG/DL
HCT VFR BLD CALC: 34.9 %
HGB BLD-MCNC: 10.8 G/DL
IMM GRANULOCYTES NFR BLD AUTO: 0 %
IRON SATN MFR SERPL: 34 %
IRON SERPL-MCNC: 97 UG/DL
LDH SERPL-CCNC: 304 U/L
LYMPHOCYTES # BLD AUTO: 1.25 K/UL
LYMPHOCYTES NFR BLD AUTO: 37.5 %
MAN DIFF?: NORMAL
MCHC RBC-ENTMCNC: 25.4 PG
MCHC RBC-ENTMCNC: 30.9 GM/DL
MCV RBC AUTO: 82.1 FL
MONOCYTES # BLD AUTO: 0.31 K/UL
MONOCYTES NFR BLD AUTO: 9.3 %
NEUTROPHILS # BLD AUTO: 1.74 K/UL
NEUTROPHILS NFR BLD AUTO: 52.3 %
PLATELET # BLD AUTO: 287 K/UL
POTASSIUM SERPL-SCNC: 3.8 MMOL/L
PROT SERPL-MCNC: 6.7 G/DL
RBC # BLD: 4.25 M/UL
RBC # FLD: 15.4 %
SODIUM SERPL-SCNC: 140 MMOL/L
TIBC SERPL-MCNC: 287 UG/DL
UIBC SERPL-MCNC: 190 UG/DL
VIT B12 SERPL-MCNC: 820 PG/ML
WBC # FLD AUTO: 3.33 K/UL

## 2019-01-15 NOTE — CONSULT LETTER
[Dear  ___] : Dear  [unfilled], [Consult Letter:] : I had the pleasure of evaluating your patient, [unfilled]. [Please see my note below.] : Please see my note below. [Consult Closing:] : Thank you very much for allowing me to participate in the care of this patient.  If you have any questions, please do not hesitate to contact me. [Sincerely,] : Sincerely, [FreeTextEntry3] : DS

## 2019-01-15 NOTE — HISTORY OF PRESENT ILLNESS
[de-identified] : pt s/p bariatric surgery , here for f/u for her  Iron def Anemia and leukopenia...doing well no complaints...

## 2019-01-16 LAB — T4 FREE SERPL DIALY-MCNC: 0.84 NG/DL

## 2019-01-16 NOTE — PHYSICAL EXAM
[Alert] : alert [No Acute Distress] : no acute distress [Healthy Appearance] : healthy appearance [Normal Sclera/Conjunctiva] : normal sclera/conjunctiva [Normal Oropharynx] : the oropharynx was normal [No Neck Mass] : no neck mass was observed [Supple] : the neck was supple [No LAD] : no lymphadenopathy [Thyroid Not Enlarged] : the thyroid was not enlarged [No Thyroid Nodules] : there were no palpable thyroid nodules [Normal Rate and Effort] : normal respiratory rhythm and effort [Clear to Auscultation] : lungs were clear to auscultation bilaterally [Normal Rate] : heart rate was normal  [Normal S1, S2] : normal S1 and S2 [Regular Rhythm] : with a regular rhythm [No Stigmata of Cushings Syndrome] : no stigmata of cushings syndrome [Normal Gait] : normal gait [Normal Insight/Judgement] : insight and judgment were intact [Kyphosis] : no kyphosis present [Acanthosis Nigricans] : no acanthosis nigricans [de-identified] : no moon facies, no supraclavicular fat pads

## 2019-01-16 NOTE — ADDENDUM
[FreeTextEntry1] : Recent thyroid function tests as below. TSH, total T4, and total T3 are normal; free T4 borderline low. Prolactin is normal. Await pending thyroxine-binding globulin and free T4 by equilibrium dialysis. 1/11/19\par \par Thyroxine-binding globulin was normal and free T4 by equilibrium dialysis was normal, although low end of normal. I discussed these results with MsLiz Baltazarlayne. We will repeat thyroid function tests with a pituitary hormone panel in 3 months, or earlier if clinical symptoms arise. 1/16/19

## 2019-01-16 NOTE — ASSESSMENT
[FreeTextEntry1] : Low free T4 with normal TSH. We will check a thyroid function panel, including free T4 by equilibrium dialysis and thyroxine-binding globulin, for further evaluation of abnormal protein binding as a cause of isolated low free T4. We will check prolactin now and other pituitary hormone evaluation if T4/T3 low in the setting of normal TSH, which would be concerning for central hypothyroidism. \par \par Vitamin D deficiency. We will start ergocalciferol 50,000 intl units every 2 weeks.\par \par CC:\par Dr. Terrence Baugh, Fax [phone]

## 2019-01-16 NOTE — HISTORY OF PRESENT ILLNESS
[FreeTextEntry1] : Ms. Carvajal is a 43 year-old woman with a history of class II obesity status post gastric sleeve with resolution of type 2 diabetes mellitus and hypertension, migraine headaches presenting for initial evaluation of low free T4. She has been recently evaluated for microscopic hematuria and abnormal blood counts. She is a former patient of Dr. Ortega, last seen in December 2015 for weight loss.\par \par Low free T4 with normal TSH.\par She has a history of normal TSH with low free T4 on her most recent blood test results. Blood tests from December 2018 showed TSH 1.96 uIU/mL with free T4 0.7 ng/dL (normal: 0.9-1.6). Blood tests from October 2018 showed TSH 2.06 uIU/mL with free T4 0.8 ng/dL.\par No history of thyroid medications. She is not currently taking any medications or supplements.\par No history of radiation exposure.\par No family history of thyroid disease. No family history of other endocrine disease or tumors.\par \par She has fatigue and constipation. Approximately 5 pound weight gain over the holidays from our chart. No palpitations, diarrhea, hair/skin changes, depression/anxiety. Menses are regular.

## 2019-02-04 ENCOUNTER — EMERGENCY (EMERGENCY)
Facility: HOSPITAL | Age: 44
LOS: 1 days | Discharge: HOME | End: 2019-02-04
Attending: EMERGENCY MEDICINE | Admitting: EMERGENCY MEDICINE
Payer: SELF-PAY

## 2019-02-04 VITALS
SYSTOLIC BLOOD PRESSURE: 143 MMHG | RESPIRATION RATE: 18 BRPM | OXYGEN SATURATION: 99 % | DIASTOLIC BLOOD PRESSURE: 102 MMHG | WEIGHT: 179.9 LBS | TEMPERATURE: 98 F | HEART RATE: 72 BPM

## 2019-02-04 DIAGNOSIS — M25.562 PAIN IN LEFT KNEE: ICD-10-CM

## 2019-02-04 DIAGNOSIS — M12.80 OTHER SPECIFIC ARTHROPATHIES, NOT ELSEWHERE CLASSIFIED, UNSPECIFIED SITE: ICD-10-CM

## 2019-02-04 DIAGNOSIS — I10 ESSENTIAL (PRIMARY) HYPERTENSION: ICD-10-CM

## 2019-02-04 DIAGNOSIS — Z90.49 ACQUIRED ABSENCE OF OTHER SPECIFIED PARTS OF DIGESTIVE TRACT: Chronic | ICD-10-CM

## 2019-02-04 DIAGNOSIS — Z79.899 OTHER LONG TERM (CURRENT) DRUG THERAPY: ICD-10-CM

## 2019-02-04 DIAGNOSIS — Z90.3 ACQUIRED ABSENCE OF STOMACH [PART OF]: Chronic | ICD-10-CM

## 2019-02-04 PROCEDURE — 73030 X-RAY EXAM OF SHOULDER: CPT | Mod: 26

## 2019-02-04 PROCEDURE — 99283 EMERGENCY DEPT VISIT LOW MDM: CPT

## 2019-02-04 PROCEDURE — 73030 X-RAY EXAM OF SHOULDER: CPT

## 2019-02-04 RX ORDER — CYCLOBENZAPRINE HYDROCHLORIDE 10 MG/1
1 TABLET, FILM COATED ORAL
Qty: 15 | Refills: 0 | OUTPATIENT
Start: 2019-02-04 | End: 2019-02-08

## 2019-02-04 RX ORDER — CYCLOBENZAPRINE HYDROCHLORIDE 10 MG/1
10 TABLET, FILM COATED ORAL ONCE
Qty: 0 | Refills: 0 | Status: COMPLETED | OUTPATIENT
Start: 2019-02-04 | End: 2019-02-04

## 2019-02-04 RX ORDER — ACETAMINOPHEN 500 MG
1000 TABLET ORAL ONCE
Qty: 0 | Refills: 0 | Status: COMPLETED | OUTPATIENT
Start: 2019-02-04 | End: 2019-02-04

## 2019-02-04 RX ORDER — ACETAMINOPHEN 500 MG
2 TABLET ORAL
Qty: 40 | Refills: 0 | OUTPATIENT
Start: 2019-02-04 | End: 2019-02-08

## 2019-02-04 RX ADMIN — Medication 1000 MILLIGRAM(S): at 20:21

## 2019-02-04 RX ADMIN — CYCLOBENZAPRINE HYDROCHLORIDE 10 MILLIGRAM(S): 10 TABLET, FILM COATED ORAL at 20:21

## 2019-02-04 NOTE — ED PROVIDER NOTE - PHYSICAL EXAMINATION
VITAL SIGNS: I have reviewed nursing notes and confirm.  CONSTITUTIONAL: Well-developed; well-nourished; in no acute distress.  SKIN: Agree with RN documentation regarding decubitus evaluation. Remainder of skin exam is warm and dry, no acute rash.  HEAD: Normocephalic; atraumatic.  EYES: PERRL, EOM intact; conjunctiva and sclera clear.  ENT: No nasal discharge; airway clear.  NECK: Supple; non tender.  CARD: S1, S2 normal; no murmurs, gallops, or rubs. RRR  RESP: No wheezes, rales or rhonchi.  ABD: Normal bowel sounds; soft; non-distended; non-tender  EXT: Limited ROM L shoulder 2/2 pain w/lateral raise and pronation, non-ttp, no erythema/effusion, L knee non-ttp w/out effusion or warmth  LYMPH: No acute cervical adenopathy.  NEURO: Alert, oriented. Grossly unremarkable.  PSYCH: Cooperative, appropriate.

## 2019-02-04 NOTE — ED ADULT NURSE NOTE - OBJECTIVE STATEMENT
Pt came to ED c/o neck pain,  left arm pain, left knee pain accompanied with tingling sensation, weakness, decreased mobility for the past 2 weeks. pt denies falls or injuries.  Positive PMS x4 with full ROM. PT denies all other medical complaints at this time. No chest pain, NO SOB, no weakness.  No facial asymmetry.  Strong equal handgrips. Ambulatory with even gait.

## 2019-02-04 NOTE — ED PROVIDER NOTE - OBJECTIVE STATEMENT
42 y/o F p/w weeks of L knee pain and L shoulder pain, worse with use. No fever/chills/swelling/redness or trauma. States her L knee has been evaluated by an orthopedist w/negative imaging, told she has an overuse injury and to go to physical therapy, which she has had difficulty arranging 2/2 insurance problems. L shoulder pain worse with lifting/use. No numbness/tingling. No reported problems with her  or dropping objects. Cannot lift arm overhead 2/2 severe pain in shoulder with that movement. No CP/SOB/palpitations or cough/recent illness. 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 Very physical.

## 2019-02-04 NOTE — ED PROVIDER NOTE - CARE PROVIDER_API CALL
Dada Monique)  Orthopaedic Surgery  [address] [address]  Phone: [phone]  Fax: [phone]

## 2019-02-04 NOTE — ED ADULT TRIAGE NOTE - CHIEF COMPLAINT QUOTE
pt c/o neck pain,  left arm pain, left knee pain accompanied with tingling sensation, weakness, decreased mobility for the past 2 weeks. pt denies falls or injuries.

## 2019-02-04 NOTE — ED PROVIDER NOTE - NSFOLLOWUPINSTRUCTIONS_ED_ALL_ED_FT
Log Out.    [facility]® CareNotes®     :  [facility]             SHOULDER PAIN - AfterCare(R) Instructions(ER/ED)     Shoulder Pain    WHAT YOU NEED TO KNOW:    Shoulder pain is a common problem that can affect your daily activities. Pain can be caused by a problem within your shoulder, such as soreness of a tendon or bursa. A tendon is a cord of tough tissue that connects your muscles to your bones. The bursa is a fluid-filled sac that acts as a cushion between a bone and a tendon. Shoulder pain may also be caused by pain that spreads to your shoulder from another part of your body.         DISCHARGE INSTRUCTIONS:    Return to the emergency department if:     You have severe pain.      You cannot move your arm or shoulder.      You have numbness or tingling in your shoulder or arm.    Contact your healthcare provider if:     Your pain gets worse or does not go away with treatment.      You have trouble moving your arm or shoulder.      You have questions or concerns about your condition or care.    Medicines: You may need any of the following:     Acetaminophen decreases pain and fever. It is available without a doctor's order. Ask how much to take and how often to take it. Follow directions. Read the labels of all other medicines you are using to see if they also contain acetaminophen, or ask your doctor or pharmacist. Acetaminophen can cause liver damage if not taken correctly. Do not use more than 4 grams (4,000 milligrams) total of acetaminophen in one day.       NSAIDs, such as ibuprofen, help decrease swelling, pain, and fever. This medicine is available with or without a doctor's order. NSAIDs can cause stomach bleeding or kidney problems in certain people. If you take blood thinner medicine, always ask your healthcare provider if NSAIDs are safe for you. Always read the medicine label and follow directions.      Take your medicine as directed. Contact your healthcare provider if you think your medicine is not helping or if you have side effects. Tell him of her if you are allergic to any medicine. Keep a list of the medicines, vitamins, and herbs you take. Include the amounts, and when and why you take them. Bring the list or the pill bottles to follow-up visits. Carry your medicine list with you in case of an emergency.    Manage your symptoms:     Apply ice on your shoulder for 20 to 30 minutes every 2 hours or as directed. Use an ice pack, or put crushed ice in a plastic bag. Cover it with a towel before you apply it to your shoulder. Ice helps prevent tissue damage and decreases swelling and pain.      Apply heat if ice does not help your symptoms. Apply heat on your shoulder for 20 to 30 minutes every 2 hours for as many days as directed. Heat helps decrease pain and muscle spasms.      Limit activities as directed. Try to avoid repeated overhead movements.      Go to physical or occupational therapy as directed. A physical therapist teaches you exercises to help improve movement and strength, and to decrease pain. An occupational therapist teaches you skills to help with your daily activities.     Prevent shoulder pain:     Maintain a good range of motion in your shoulder. Ask your healthcare provider which exercises you should do on a regular basis after you have healed.       Stretch and strengthen your shoulder. Use proper technique during exercises and sports.    Follow up with your healthcare provider or orthopedist as directed: Write down your questions so you remember to ask them during your visits.

## 2019-02-04 NOTE — ED PROVIDER NOTE - MEDICAL DECISION MAKING DETAILS
No tanna injury or evidence of infectious process, likely MSK rotator cuff injury, safe to f/u outpt with Orthopedist Dr. Monique, who pt is already following for knee pain. Given return precautions and anticipatory guidance.

## 2019-02-05 ENCOUNTER — OTHER (OUTPATIENT)
Age: 44
End: 2019-02-05

## 2019-03-15 ENCOUNTER — APPOINTMENT (OUTPATIENT)
Dept: UROLOGY | Facility: CLINIC | Age: 44
End: 2019-03-15
Payer: COMMERCIAL

## 2019-03-15 VITALS
HEART RATE: 66 BPM | WEIGHT: 182 LBS | TEMPERATURE: 98.9 F | OXYGEN SATURATION: 99 % | DIASTOLIC BLOOD PRESSURE: 85 MMHG | SYSTOLIC BLOOD PRESSURE: 124 MMHG | BODY MASS INDEX: 35.73 KG/M2 | HEIGHT: 60 IN

## 2019-03-15 PROCEDURE — 99213 OFFICE O/P EST LOW 20 MIN: CPT

## 2019-03-18 NOTE — HISTORY OF PRESENT ILLNESS
[Urinary Incontinence] : urinary incontinence [Nocturia] : nocturia [Fatigue] : fatigue [None] : None [FreeTextEntry1] : 45 yo female with history of microhematuria\par Here today for follow up\par Denies urgency/frequency in the day\par Nocturia 1-2 hours. Started two months ago. Admits to high volume of stress that started around the same time. \par Mild incontinence, PPD x's 2 (liners) \par Occasionally urinates once in the daytime\par Restricts both fluid and food intake for fear of gaining weight (past bariatric surgery). \par Increased fatigue\par Denies gross hematuria\par Denies dysuria or foul smelling urine  [Urinary Retention] : no urinary retention [Straining] : no straining [Weak Stream] : no weak stream [Intermittency] : no intermittency [Post-Void Dribbling] : no post-void dribbling [Dysuria] : no dysuria [Hematuria - Gross] : no gross hematuria [Bladder Spasm] : no bladder spasm [Abdominal Pain] : no abdominal pain [Flank Pain] : no flank pain [Edema] : ~T edema was not present [Fever] : no fever [Nausea] : no nausea

## 2019-03-18 NOTE — ASSESSMENT
[FreeTextEntry1] : Nocturia \par LUTs\par \par Increase water intake in the day time\par Eat full meals (Refer to GI/dietician, s/p bariatric surgery)\par Set times throughout the day to urinate\par Decrease fluid intake 2 hours prior to sleep\par Practice Kegel exercises to strengthen pelvic floor. \par F/u in 6 month

## 2019-04-01 ENCOUNTER — APPOINTMENT (OUTPATIENT)
Dept: BARIATRICS | Facility: CLINIC | Age: 44
End: 2019-04-01

## 2019-04-08 ENCOUNTER — APPOINTMENT (OUTPATIENT)
Dept: ENDOCRINOLOGY | Facility: CLINIC | Age: 44
End: 2019-04-08

## 2019-04-08 ENCOUNTER — APPOINTMENT (OUTPATIENT)
Dept: NEPHROLOGY | Facility: CLINIC | Age: 44
End: 2019-04-08

## 2019-05-15 ENCOUNTER — APPOINTMENT (OUTPATIENT)
Dept: BARIATRICS | Facility: CLINIC | Age: 44
End: 2019-05-15
Payer: COMMERCIAL

## 2019-05-15 VITALS
WEIGHT: 188.25 LBS | TEMPERATURE: 97.5 F | BODY MASS INDEX: 36.96 KG/M2 | HEIGHT: 60 IN | HEART RATE: 73 BPM | DIASTOLIC BLOOD PRESSURE: 74 MMHG | OXYGEN SATURATION: 98 % | SYSTOLIC BLOOD PRESSURE: 109 MMHG

## 2019-05-15 PROCEDURE — 99213 OFFICE O/P EST LOW 20 MIN: CPT

## 2019-05-15 NOTE — HISTORY OF PRESENT ILLNESS
[de-identified] : s/p vsg [id number] post op down 54 pounds\par has gained weight this year\par will refer to [facility] for medical therapy\par and idetician

## 2019-06-20 ENCOUNTER — APPOINTMENT (OUTPATIENT)
Dept: UROLOGY | Facility: CLINIC | Age: 44
End: 2019-06-20

## 2019-08-07 NOTE — DISCHARGE NOTE ADULT - PROVIDER TOKENS
TOKEN:'[id number]'

## 2019-11-12 NOTE — ED ADULT NURSE NOTE - INTEGUMENTARY WDL
COURTESY EFREN SENT   PT NEEDS TO SCHEDULE APPOINTMENT FOR MED TITRATION   Color consistent with ethnicity/race, warm, dry intact, resilient.

## 2020-03-16 NOTE — BRIEF OPERATIVE NOTE - BRIEF OP NOTE DRAINS
Patient called and confirmed that colonoscopy procedure is now cancelled on  March 31.  Patient understands and has no further questions.   none

## 2020-12-16 PROBLEM — Z87.440 HISTORY OF URINARY TRACT INFECTION: Status: RESOLVED | Noted: 2018-12-06 | Resolved: 2020-12-16

## 2021-01-28 ENCOUNTER — APPOINTMENT (OUTPATIENT)
Dept: UROLOGY | Facility: CLINIC | Age: 46
End: 2021-01-28
Payer: COMMERCIAL

## 2021-01-28 VITALS
SYSTOLIC BLOOD PRESSURE: 135 MMHG | WEIGHT: 200 LBS | HEIGHT: 65 IN | BODY MASS INDEX: 33.32 KG/M2 | DIASTOLIC BLOOD PRESSURE: 88 MMHG | TEMPERATURE: 98 F | HEART RATE: 62 BPM

## 2021-01-28 DIAGNOSIS — R35.1 NOCTURIA: ICD-10-CM

## 2021-01-28 DIAGNOSIS — R39.9 UNSPECIFIED SYMPTOMS AND SIGNS INVOLVING THE GENITOURINARY SYSTEM: ICD-10-CM

## 2021-01-28 PROCEDURE — 99213 OFFICE O/P EST LOW 20 MIN: CPT

## 2021-01-28 PROCEDURE — 99072 ADDL SUPL MATRL&STAF TM PHE: CPT

## 2021-01-28 NOTE — ASSESSMENT
[FreeTextEntry1] : microhematuria, persistent\par UA Ucx, cytology today\par CT Urogram\par \par OAB, nocturia\par stable overall, timed voiding discussed\par f/u after CT

## 2021-01-28 NOTE — PHYSICAL EXAM
[General Appearance - Well Developed] : well developed [Abdomen Soft] : soft [Skin Color & Pigmentation] : normal skin color and pigmentation [Heart Rate And Rhythm] : Heart rate and rhythm were normal [] : no respiratory distress [Oriented To Time, Place, And Person] : oriented to person, place, and time [Normal Station and Gait] : the gait and station were normal for the patient's age [No Focal Deficits] : no focal deficits [FreeTextEntry1] : pelvic deferred

## 2021-01-28 NOTE — HISTORY OF PRESENT ILLNESS
[FreeTextEntry1] : Microhematuria \par reports still present, PCP asked to check\par UA 11/2018 - 39 RBC\par Ucx 11/2018- +E-Coli\par had negative cystoscopy, but not CT Urogram\par reports had recent labs, including urine test done, but no report visible\par PVR today 96 mL\par \par Also c/o nocturia x 3 still\par intermittent incontinence now improved, says wears small thin liner for protection some days\par stops fluids at 8 pm\par No daytime urinary frequency. Last period 3 weeks ago\par Denies gross hematuria, dysuria, or recent UTIs\par \par FMHx: negative for Uro Ca\par Social: no smoking

## 2021-01-29 LAB
APPEARANCE: ABNORMAL
BACTERIA: NEGATIVE
BILIRUBIN URINE: NEGATIVE
BLOOD URINE: ABNORMAL
COLOR: YELLOW
GLUCOSE QUALITATIVE U: NEGATIVE
HYALINE CASTS: 4 /LPF
KETONES URINE: NEGATIVE
LEUKOCYTE ESTERASE URINE: NEGATIVE
MICROSCOPIC-UA: NORMAL
NITRITE URINE: NEGATIVE
PH URINE: 6.5
PROTEIN URINE: ABNORMAL
RED BLOOD CELLS URINE: 56 /HPF
SPECIFIC GRAVITY URINE: 1.03
SQUAMOUS EPITHELIAL CELLS: 8 /HPF
UROBILINOGEN URINE: ABNORMAL
WHITE BLOOD CELLS URINE: 4 /HPF

## 2021-02-01 LAB — BACTERIA UR CULT: NORMAL

## 2021-02-02 LAB — URINE CYTOLOGY: NORMAL

## 2021-02-12 ENCOUNTER — APPOINTMENT (OUTPATIENT)
Dept: NEPHROLOGY | Facility: CLINIC | Age: 46
End: 2021-02-12
Payer: COMMERCIAL

## 2021-02-12 ENCOUNTER — LABORATORY RESULT (OUTPATIENT)
Age: 46
End: 2021-02-12

## 2021-02-12 VITALS — SYSTOLIC BLOOD PRESSURE: 143 MMHG | DIASTOLIC BLOOD PRESSURE: 99 MMHG

## 2021-02-12 VITALS — HEART RATE: 68 BPM | RESPIRATION RATE: 14 BRPM | DIASTOLIC BLOOD PRESSURE: 98 MMHG | SYSTOLIC BLOOD PRESSURE: 142 MMHG

## 2021-02-12 PROCEDURE — 99214 OFFICE O/P EST MOD 30 MIN: CPT

## 2021-02-12 PROCEDURE — 99072 ADDL SUPL MATRL&STAF TM PHE: CPT

## 2021-02-12 RX ORDER — ERGOCALCIFEROL 1.25 MG/1
1.25 MG CAPSULE, LIQUID FILLED ORAL
Qty: 6 | Refills: 3 | Status: DISCONTINUED | COMMUNITY
Start: 2019-01-10 | End: 2021-02-12

## 2021-02-27 ENCOUNTER — OUTPATIENT (OUTPATIENT)
Dept: OUTPATIENT SERVICES | Facility: HOSPITAL | Age: 46
LOS: 1 days | End: 2021-02-27

## 2021-02-27 ENCOUNTER — APPOINTMENT (OUTPATIENT)
Dept: CT IMAGING | Facility: CLINIC | Age: 46
End: 2021-02-27
Payer: COMMERCIAL

## 2021-02-27 ENCOUNTER — RESULT REVIEW (OUTPATIENT)
Age: 46
End: 2021-02-27

## 2021-02-27 DIAGNOSIS — Z90.3 ACQUIRED ABSENCE OF STOMACH [PART OF]: Chronic | ICD-10-CM

## 2021-02-27 DIAGNOSIS — Z90.49 ACQUIRED ABSENCE OF OTHER SPECIFIED PARTS OF DIGESTIVE TRACT: Chronic | ICD-10-CM

## 2021-02-27 PROCEDURE — 74178 CT ABD&PLV WO CNTR FLWD CNTR: CPT | Mod: 26

## 2021-03-01 LAB
ALBUMIN MFR SERPL ELPH: 58.8 %
ALBUMIN SERPL ELPH-MCNC: 4.1 G/DL
ALBUMIN SERPL-MCNC: 3.9 G/DL
ALBUMIN/GLOB SERPL: 1.4 RATIO
ALDOSTERONE SERUM: 30.7 NG/DL
ALPHA1 GLOB MFR SERPL ELPH: 3.7 %
ALPHA1 GLOB SERPL ELPH-MCNC: 0.2 G/DL
ALPHA2 GLOB MFR SERPL ELPH: 10 %
ALPHA2 GLOB SERPL ELPH-MCNC: 0.7 G/DL
ANA PAT FLD IF-IMP: ABNORMAL
ANA SER IF-ACNC: ABNORMAL
ANION GAP SERPL CALC-SCNC: 12 MMOL/L
APPEARANCE: CLEAR
B-GLOBULIN MFR SERPL ELPH: 11.5 %
B-GLOBULIN SERPL ELPH-MCNC: 0.8 G/DL
B2 GLYCOPROT1 IGA SERPL IA-ACNC: <5 SAU
B2 GLYCOPROT1 IGG SER-ACNC: <5 SGU
B2 GLYCOPROT1 IGM SER-ACNC: <5 SMU
BACTERIA: NEGATIVE
BILIRUBIN URINE: NEGATIVE
BLOOD URINE: ABNORMAL
BUN SERPL-MCNC: 11 MG/DL
C3 SERPL-MCNC: 148 MG/DL
C4 SERPL-MCNC: 36 MG/DL
CALCIUM SERPL-MCNC: 9 MG/DL
CARDIOLIPIN AB SER IA-ACNC: NEGATIVE
CCP AB SER IA-ACNC: <8 UNITS
CHLORIDE SERPL-SCNC: 102 MMOL/L
CK SERPL-CCNC: 178 U/L
CO2 SERPL-SCNC: 26 MMOL/L
COLOR: YELLOW
CONFIRM: 29.3 SEC
CORTIS SERPL-MCNC: 5.4 UG/DL
CREAT SERPL-MCNC: 1.02 MG/DL
CREAT SPEC-SCNC: 167 MG/DL
CREAT/PROT UR: 0.8 RATIO
CYSTATIN C SERPL-MCNC: 0.94 MG/L
DEPRECATED KAPPA LC FREE/LAMBDA SER: 1.17 RATIO
DRVVT IMM 1:2 NP PPP: NORMAL
DRVVT SCREEN TO CONFIRM RATIO: 0.95 RATIO
DSDNA AB SER-ACNC: <12 IU/ML
ENA RNP AB SER IA-ACNC: <0.2 AL
ENA SM AB SER IA-ACNC: <0.2 AL
GAMMA GLOB FLD ELPH-MCNC: 1.1 G/DL
GAMMA GLOB MFR SERPL ELPH: 16 %
GBM AB TITR SER IF: 6
GFR/BSA.PRED SERPLBLD CYS-BASED-ARV: 83 ML/MIN
GLUCOSE QUALITATIVE U: NEGATIVE
GLUCOSE SERPL-MCNC: 81 MG/DL
HBV CORE IGG+IGM SER QL: NONREACTIVE
HBV CORE IGM SER QL: NONREACTIVE
HBV SURFACE AB SER QL: ABNORMAL
HBV SURFACE AG SER QL: NONREACTIVE
HCV AB SER QL: NONREACTIVE
HCV S/CO RATIO: 0.12 S/CO
HYALINE CASTS: 0 /LPF
INTERPRETATION SERPL IEP-IMP: NORMAL
KAPPA LC CSF-MCNC: 1.83 MG/DL
KAPPA LC SERPL-MCNC: 2.15 MG/DL
KETONES URINE: NEGATIVE
LEUKOCYTE ESTERASE URINE: NEGATIVE
M PROTEIN SPEC IFE-MCNC: NORMAL
MAGNESIUM SERPL-MCNC: 1.9 MG/DL
METANEPHRINE, PL: 15.6 PG/ML
MICROSCOPIC-UA: NORMAL
MPO AB + PR3 PNL SER: NORMAL
NITRITE URINE: NEGATIVE
NORMETANEPHRINE, PL: 129.4 PG/ML
PH URINE: 6.5
PHOSPHATE SERPL-MCNC: 3.6 MG/DL
PHOSPHOLIPASE A2 RECEPTOR ELISA: <2 RU/ML
POTASSIUM SERPL-SCNC: 4.1 MMOL/L
PROT SERPL-MCNC: 6.7 G/DL
PROT SERPL-MCNC: 6.7 G/DL
PROT UR-MCNC: 125 MG/DL
PROTEIN URINE: ABNORMAL
RED BLOOD CELLS URINE: 16 /HPF
RENIN ACTIVITY, PLASMA: 0.56 NG/ML/HR
RF+CCP IGG SER-IMP: NEGATIVE
RHEUMATOID FACT SER QL: <10 IU/ML
SCREEN DRVVT: 30.6 SEC
SODIUM SERPL-SCNC: 140 MMOL/L
SPECIFIC GRAVITY URINE: 1.02
SQUAMOUS EPITHELIAL CELLS: 1 /HPF
UROBILINOGEN URINE: NORMAL
WHITE BLOOD CELLS URINE: 1 /HPF

## 2021-03-01 NOTE — HISTORY OF PRESENT ILLNESS
[FreeTextEntry1] : 44yo AA F s/p bariatric surgery 2015, migraine h/a, non obstruct L nephrolithiasis, wth HTN and h/o nephrolithiasis in remote past referred by PCP Dr. Baugh for evaluation of microscopic hematuria: \par 42yoF with PMH obesity s/p LSG March 2017 presents \par \par Intermittent urinary incontinence, frequency as well, sensation of incomplete voiding at times because needs to go back to urinate 15min after she just finished. This doesn't happen every day. She doesn't drink much fluids so doesn't think intake is related. No dysuria, no visible hematuria. Notes nocturia frequency is almost nightly and more bothersome than daytime sx. Wakes up 4-5x/night to the point where it's disrupting her sleep and she feels tired. Describes back pain that is central, between shoulder and sharp at at times, but also has lower central  back pain that is relieved with stretch back or forward but worsened but going from bent over to standing, often relieved by "cracking" her back or stretching. Better at night than during the day. Back pain comes and goes, is not constant and not associated urinating. \par \par ++ cramping in R>L legs at night for the last 4 months. Then on wakening she has to massage her leg muscles to make them feel better. Taken off in 2020 because she no lnher needed.\par + fatigue\par \par Placed on BP meds about 2 years ago, prior to this BPs were low/venkatesh.. \par Gained some weight during the pandemic.\par \par First in 2016 (once), then reoccurred in Oct 2018, microscopic hematuria on routine analysis with PCP. Never seen gross hematuria, no urinary sx, no dysuria. She thinks she's had a kidney stone "a long time ago" she had flank pain, told she had a stone and to drink a  lot of water, passed it (visualized but lost in toilet). \par Has mild occ lower back pain, non localizing, never experienced pain in flank similar to episode of renal stone years ago. \par \par HTN dx around 16yrs old. Now well controlled per pt. MIgraine headaches occ. \par \par Uses diclofenac 1-3x/week for migraines, have been better lately, maybe only once/week. Rare tylenol. No other herbal products. \par \par Saw A nephrologist for "mild kidney problems", last year but never folllowed up. Possibly dr Zacarias. \par  2.2cm L hypodense mass in kidney. never smoker. CT 2016 confimrs no enhancing mass. no family history prostate bladder kidney. Follows with Dr. Weinstein\par Fam Hx: all her family with HTN, no other medical problem. \par Works as a [employer]. Has two children here. From [place] origin[place]. No etoh/drugs

## 2021-03-01 NOTE — ASSESSMENT
[FreeTextEntry1] : needs update\par \par Mild leukopenia, anemia, persists now for at least 4 years, saw Dr Nation in past dx Fe def anemia, per notes saw Dr Davenport in the past as well. No other dx that she's aware of. \par \par \par \par # Elevated creatinine, Microscopic hematuria - may be 2/2 asymp renal stone, although myriad of MSK sx, and luekopenia/anemia - would like to r/o a systemic process such as SLE or a monoclonal paraproteinemia\par  REviewed Dr Weinstein's work up, Ecoli uti s/p treatment and repeat negative culture. Urine cytology negative. \par - saw  last month because of persistent microscopic hematuria and urinary frequency/incontinences/nocturia,  h/o negative cystoscopy, PVR 96. Urine cytology with non-specific degenerated urothelial cells, going for CT urogram. U/A with 56 RBCs, elevated SG, 8 epi's and 300 protein (?2/2 blood vs true glomerular proteinuria)\par \par Reviewed Jan/21 labs, Cr up to 1.22 from baseline 0.8-1, egfr 50-60 down from 80-90s. May be normal fluctuation however with active urinary sediment and systemic sx I'm concerned for possible udnerlying GN. Wioll sened serologic GN w/u. If Cr worsening may consider renal biopsy as well. \par \par Renal sono 2018 R 11cm L 10cm with 0.4cm non obstructing calculus and 1.2cm simple cyst, normal bloadder\par \par HTN well controlled since bariatric surgery, diet/lifestyle managed.\par \par Addendum: reviewed labs, serologic GN w/u negative, 16rbc in urine, pcr 0.8g. RBCs may be 2/2 stone, getting CT urogram from Dr Weinstein. May be underlying GN such as IgA Nephropathy. Needs optho eval for diabetic retinopathy. Cystatin C egfr 88, CKD II
15

## 2021-03-02 ENCOUNTER — APPOINTMENT (OUTPATIENT)
Dept: UROLOGY | Facility: CLINIC | Age: 46
End: 2021-03-02
Payer: COMMERCIAL

## 2021-03-02 VITALS — TEMPERATURE: 96.5 F | DIASTOLIC BLOOD PRESSURE: 94 MMHG | SYSTOLIC BLOOD PRESSURE: 136 MMHG | HEART RATE: 70 BPM

## 2021-03-02 PROCEDURE — 99212 OFFICE O/P EST SF 10 MIN: CPT

## 2021-03-02 PROCEDURE — 99072 ADDL SUPL MATRL&STAF TM PHE: CPT

## 2021-03-02 NOTE — LETTER BODY
[Dear  ___] : Dear  [unfilled], [FreeTextEntry2] : Terrence Baugh MD\par [facility]\par [address]\[place] [address] [FreeTextEntry1] : I saw  NIYAH KING back in follow up today. Please see the attached note for full details.\par \par Thank you very much for allowing me to participate in the care of this patient. If you have any questions please feel free to call me at any time. \par \par \par Sincerely yours,\par \par \par \par Barak Weinstein MD, JENNIFER\par Director, Male Fertility and Microsurgery\par  of Urology\par [facility]\par

## 2021-03-02 NOTE — HISTORY OF PRESENT ILLNESS
[FreeTextEntry1] : Microhematuria, f/u post CT Urogram - 02/27/20 - Negative for stones\par UA - 02/12/2021 - 16 RBC (was not on her period)\par No repeat UA since\par had negative cystoscopy in 2018, but not CT Urogram at that time\par urine cytology - negative\par No urinary c/o, nocturia 1-2. Has insomnia\par

## 2021-03-02 NOTE — PHYSICAL EXAM
[General Appearance - Well Developed] : well developed [Abdomen Soft] : soft [Skin Color & Pigmentation] : normal skin color and pigmentation [Heart Rate And Rhythm] : Heart rate and rhythm were normal [] : no respiratory distress [Oriented To Time, Place, And Person] : oriented to person, place, and time [Not Anxious] : not anxious [Normal Station and Gait] : the gait and station were normal for the patient's age [No Focal Deficits] : no focal deficits [No Palpable Adenopathy] : no palpable adenopathy

## 2021-03-02 NOTE — ASSESSMENT
[FreeTextEntry1] : microhematuria\par work up completed now\par Repeat UA and UCx today to reassess microhematuria\par f/u results over the phone\par f/u in one year

## 2021-03-03 LAB
APPEARANCE: CLEAR
BACTERIA: NEGATIVE
BILIRUBIN URINE: NEGATIVE
BLOOD URINE: ABNORMAL
COLOR: COLORLESS
GLUCOSE QUALITATIVE U: NEGATIVE
HYALINE CASTS: 2 /LPF
KETONES URINE: NEGATIVE
LEUKOCYTE ESTERASE URINE: NEGATIVE
MICROSCOPIC-UA: NORMAL
NITRITE URINE: NEGATIVE
PH URINE: 6.5
PROTEIN URINE: ABNORMAL
RED BLOOD CELLS URINE: 5 /HPF
SPECIFIC GRAVITY URINE: 1.01
SQUAMOUS EPITHELIAL CELLS: 2 /HPF
UROBILINOGEN URINE: NORMAL
WHITE BLOOD CELLS URINE: 0 /HPF

## 2021-03-05 LAB — BACTERIA UR CULT: NORMAL

## 2021-03-24 ENCOUNTER — APPOINTMENT (OUTPATIENT)
Dept: NEPHROLOGY | Facility: CLINIC | Age: 46
End: 2021-03-24
Payer: COMMERCIAL

## 2021-03-24 DIAGNOSIS — Z86.39 PERSONAL HISTORY OF OTHER ENDOCRINE, NUTRITIONAL AND METABOLIC DISEASE: ICD-10-CM

## 2021-03-24 DIAGNOSIS — R79.89 OTHER SPECIFIED ABNORMAL FINDINGS OF BLOOD CHEMISTRY: ICD-10-CM

## 2021-03-24 LAB
ALBUMIN SERPL ELPH-MCNC: 3.4 G/DL
ANION GAP SERPL CALC-SCNC: 4 MMOL/L
APTT BLD: 26.2 SEC
BUN SERPL-MCNC: 16 MG/DL
CALCIUM SERPL-MCNC: 8.8 MG/DL
CHLORIDE SERPL-SCNC: 105 MMOL/L
CO2 SERPL-SCNC: 32 MMOL/L
CREAT SERPL-MCNC: 1.21 MG/DL
GLUCOSE SERPL-MCNC: 79 MG/DL
INR PPP: 0.98 RATIO
PHOSPHATE SERPL-MCNC: 3.6 MG/DL
POTASSIUM SERPL-SCNC: 4.7 MMOL/L
PT BLD: 11.6 SEC
SODIUM SERPL-SCNC: 141 MMOL/L

## 2021-03-24 PROCEDURE — 99214 OFFICE O/P EST MOD 30 MIN: CPT | Mod: 95

## 2021-03-25 NOTE — PATIENT PROFILE ADULT. - PMH
I placed an order for a U/A and culture.   I'm okay with her doing a lab only visit and will look out for the results and give her a call.    MIKA Osuna CNM 3/25/2021 10:06 AM     Acromioclavicular joint arthritis    Diabetes mellitus    Knee pain    Migraine headache    Morbid obesity    Subacromial bursitis    Vitamin D deficiency Diabetes mellitus    Knee pain    Migraine headache    Morbid obesity    Vitamin D deficiency Hypertension    Knee pain    Migraine headache    Morbid obesity    Vitamin D deficiency

## 2021-03-26 NOTE — ASSESSMENT
[FreeTextEntry1] : 45yo AA F s/p laparoscopic sleeve gastrectomy '17, with migraine h/a (on topiramate in the past), h/o non-obstructing L nephrolithiasis '18 passed asymptomatically, HTN dx around 16 years old, CKD II with non-nephrotic proteinuria and  microscopic hematuria of unclear etiology here for f/u via video: \par \par # CKD II with non-nephrotic proteinuria and microscopic hematuria\par - Cr baseline 0.9-1, now 1.2 eGFR 54-62 2/2 starting Telmisartan, Cystatin C egfr 88\par - serologic GN w/u incl antiphospholipids and paraproteinemia all negative. Renal sono 2018 R 11cm L 10cm with 0.4cm non obstructing calculus and 1.2cm simple cyst, normal bladder. CT Urogram 2/21 mild L cortical scarring and some small simple cysts, no stones, no urinary retention. Normal adrenals. Full urologic w/u for hematuria is negative incl cystoscopy and urine cytology. \par - ?IgA Nephropathy ?hypertensive nephrosclerosis, can occasionally have some hematuria although less likely nelly as she has largely only had prediabetes. She has a myriad of MSK sx, and intermittent leukopenia/anemia suggesting a possible systemic process such as SLE or a monoclonal paraproteinemia. \par \par # HTN - radha 30, PRA 0.5, no other stigmata of hyperaldosteronism. Adrenals normal on CT 2/21. \par \par Plan - recheck renal fx and urine in 4 weeks. If proteinuria or renal function worsens, will need renal biopsy. \par - f/u with her hematologist and PCP\par - check her BP twice daily for the next 3 days to get baseline. Goal BP </=130/80\par - recheck radha/PRA\par \par fu in 6 weeks\par \par Discussed with patient: You have chosen to receive care through the use of tele-media. It enables health care providers at different locations to provide safe, effective, and convenient care through the use of technology. Please note this is a billable encounter. As with any health care service, there are risks associated with the use of tele-media, including  issues. You understand that I cannot physically examine you and that you may need to come to the office to complete the assessment. \par The patient agreed verbally and understands the risks and benefits of tele-media as explained. All questions regarding tele-media encounters were answered.\par \par

## 2021-03-26 NOTE — ASSESSMENT
[FreeTextEntry1] : 47yo AA F s/p laparoscopic sleeve gastrectomy '17, with migraine h/a (on topiramate in the past), h/o non-obstructing L nephrolithiasis '18 passed asymptomatically, HTN dx around 16 years old, CKD II with non-nephrotic proteinuria and  microscopic hematuria of unclear etiology here for f/u via video: \par \par # CKD II with non-nephrotic proteinuria and microscopic hematuria\par - Cr baseline 0.9-1, now 1.2 eGFR 54-62 2/2 starting Telmisartan, Cystatin C egfr 88\par - serologic GN w/u incl antiphospholipids and paraproteinemia all negative. Renal sono 2018 R 11cm L 10cm with 0.4cm non obstructing calculus and 1.2cm simple cyst, normal bladder. CT Urogram 2/21 mild L cortical scarring and some small simple cysts, no stones, no urinary retention. Normal adrenals. Full urologic w/u for hematuria is negative incl cystoscopy and urine cytology. \par - ?IgA Nephropathy ?hypertensive nephrosclerosis, can occasionally have some hematuria although less likely nelly as she has largely only had prediabetes. She has a myriad of MSK sx, and intermittent leukopenia/anemia suggesting a possible systemic process such as SLE or a monoclonal paraproteinemia. \par \par # HTN - radha 30, PRA 0.5, no other stigmata of hyperaldosteronism. Adrenals normal on CT 2/21. \par \par Plan - recheck renal fx and urine in 4 weeks. If proteinuria or renal function worsens, will need renal biopsy. \par - f/u with her hematologist and PCP\par - check her BP twice daily for the next 3 days to get baseline. Goal BP </=130/80\par - recheck radha/PRA\par \par fu in 6 weeks\par \par Discussed with patient: You have chosen to receive care through the use of tele-media. It enables health care providers at different locations to provide safe, effective, and convenient care through the use of technology. Please note this is a billable encounter. As with any health care service, there are risks associated with the use of tele-media, including  issues. You understand that I cannot physically examine you and that you may need to come to the office to complete the assessment. \par The patient agreed verbally and understands the risks and benefits of tele-media as explained. All questions regarding tele-media encounters were answered.\par \par

## 2021-03-26 NOTE — HISTORY OF PRESENT ILLNESS
[Home] : at home, [unfilled] , at the time of the visit. [Medical Office: ([facility])___] : at the medical office located in  [Verbal consent obtained from patient] : the patient, [unfilled] [FreeTextEntry1] : \par 47yo AA F s/p laparoscopic sleeve gastrectomy '17, with migraine h/a (on topiramate in the past), h/o non-obstructing L nephrolithiasis '18 passed asymptomatically, HTN dx around 16 years old, CKD II with non-nephrotic proteinuria and  microscopic hematuria of unclear etiology here for f/u via video: \par \par Has been feeling well. Only complaint is persistent mid lower back pain since a fall last month for which she's using cold compresses and tylenol. \par Not checking her BPs at home. Occ with headache or dizziness that self resolve, similar to baseline. \par \par ROS + cramping in legs R>L nelly at night, + fatigue\par \par HTN Hx:\par Placed on BP meds about 2 years ago despite having HTN dx in her teens, per pt wasn't requiring medication as it was controlled. Morbid Obesity in past as well s/p sleeve gastrectomy\par \par DMII Hx: no true DM per pt, prediabetes resolved s/p sleeve, A1C's in 5's since 2017\par \par Hematuria Hx:\par First in 2016 (once), then reoccurred in Oct 2018, microscopic hematuria on routine analysis with PCP. Never seen gross hematuria, no urinary sx, no dysuria. She thinks she's had a kidney stone "a long time ago" she had flank pain, told she had a stone and to drink a  lot of water, passed it (visualized but lost in toilet). \par Has mild occ lower back pain, non localizing, never experienced pain in flank similar to episode of renal stone years ago. \par \par Fam Hx: all her family with HTN, no other medical problem. 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 No etoh/drugs

## 2021-03-26 NOTE — HISTORY OF PRESENT ILLNESS
[Home] : at home, [unfilled] , at the time of the visit. [Medical Office: ([facility])___] : at the medical office located in  [Verbal consent obtained from patient] : the patient, [unfilled] [FreeTextEntry1] : \par 45yo AA F s/p laparoscopic sleeve gastrectomy '17, with migraine h/a (on topiramate in the past), h/o non-obstructing L nephrolithiasis '18 passed asymptomatically, HTN dx around 16 years old, CKD II with non-nephrotic proteinuria and  microscopic hematuria of unclear etiology here for f/u via video: \par \par Has been feeling well. Only complaint is persistent mid lower back pain since a fall last month for which she's using cold compresses and tylenol. \par Not checking her BPs at home. Occ with headache or dizziness that self resolve, similar to baseline. \par \par ROS + cramping in legs R>L nelly at night, + fatigue\par \par HTN Hx:\par Placed on BP meds about 2 years ago despite having HTN dx in her teens, per pt wasn't requiring medication as it was controlled. Morbid Obesity in past as well s/p sleeve gastrectomy\par \par DMII Hx: no true DM per pt, prediabetes resolved s/p sleeve, A1C's in 5's since 2017\par \par Hematuria Hx:\par First in 2016 (once), then reoccurred in Oct 2018, microscopic hematuria on routine analysis with PCP. Never seen gross hematuria, no urinary sx, no dysuria. She thinks she's had a kidney stone "a long time ago" she had flank pain, told she had a stone and to drink a  lot of water, passed it (visualized but lost in toilet). \par Has mild occ lower back pain, non localizing, never experienced pain in flank similar to episode of renal stone years ago. \par \par Fam Hx: all her family with HTN, no other medical problem. 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 No etoh/drugs

## 2021-04-20 ENCOUNTER — RESULT REVIEW (OUTPATIENT)
Age: 46
End: 2021-04-20

## 2021-04-20 ENCOUNTER — APPOINTMENT (OUTPATIENT)
Dept: ORTHOPEDIC SURGERY | Facility: CLINIC | Age: 46
End: 2021-04-20
Payer: COMMERCIAL

## 2021-04-20 ENCOUNTER — OUTPATIENT (OUTPATIENT)
Dept: OUTPATIENT SERVICES | Facility: HOSPITAL | Age: 46
LOS: 1 days | End: 2021-04-20
Payer: COMMERCIAL

## 2021-04-20 VITALS
HEIGHT: 65 IN | HEART RATE: 76 BPM | SYSTOLIC BLOOD PRESSURE: 124 MMHG | OXYGEN SATURATION: 98 % | WEIGHT: 200 LBS | TEMPERATURE: 97.5 F | DIASTOLIC BLOOD PRESSURE: 88 MMHG | BODY MASS INDEX: 33.32 KG/M2

## 2021-04-20 VITALS — TEMPERATURE: 97.5 F

## 2021-04-20 DIAGNOSIS — Z90.3 ACQUIRED ABSENCE OF STOMACH [PART OF]: Chronic | ICD-10-CM

## 2021-04-20 DIAGNOSIS — Z60.2 PROBLEMS RELATED TO LIVING ALONE: ICD-10-CM

## 2021-04-20 DIAGNOSIS — Z90.49 ACQUIRED ABSENCE OF OTHER SPECIFIED PARTS OF DIGESTIVE TRACT: Chronic | ICD-10-CM

## 2021-04-20 PROCEDURE — 99072 ADDL SUPL MATRL&STAF TM PHE: CPT

## 2021-04-20 PROCEDURE — 72100 X-RAY EXAM L-S SPINE 2/3 VWS: CPT

## 2021-04-20 PROCEDURE — 72082 X-RAY EXAM ENTIRE SPI 2/3 VW: CPT

## 2021-04-20 PROCEDURE — 99214 OFFICE O/P EST MOD 30 MIN: CPT

## 2021-04-20 PROCEDURE — 72083 X-RAY EXAM ENTIRE SPI 4/5 VW: CPT | Mod: 26

## 2021-04-20 SDOH — SOCIAL STABILITY - SOCIAL INSECURITY: PROBLEMS RELATED TO LIVING ALONE: Z60.2

## 2021-04-27 ENCOUNTER — APPOINTMENT (OUTPATIENT)
Dept: PULMONOLOGY | Facility: CLINIC | Age: 46
End: 2021-04-27
Payer: COMMERCIAL

## 2021-04-27 ENCOUNTER — NON-APPOINTMENT (OUTPATIENT)
Age: 46
End: 2021-04-27

## 2021-04-27 VITALS
OXYGEN SATURATION: 100 % | RESPIRATION RATE: 12 BRPM | WEIGHT: 206 LBS | HEART RATE: 77 BPM | HEIGHT: 65 IN | DIASTOLIC BLOOD PRESSURE: 86 MMHG | SYSTOLIC BLOOD PRESSURE: 123 MMHG | BODY MASS INDEX: 34.32 KG/M2 | TEMPERATURE: 97.9 F

## 2021-04-27 PROCEDURE — 99072 ADDL SUPL MATRL&STAF TM PHE: CPT

## 2021-04-27 PROCEDURE — 99204 OFFICE O/P NEW MOD 45 MIN: CPT

## 2021-04-27 NOTE — DISCUSSION/SUMMARY
[de-identified] : Discussed the results of the patient's history, physical exam, and imaging. I am recommending an MRI lumbar without contrast, order given. Also given prescriptions and instructions for cyclobenzaprine and gabapentin. The patient will follow up with me after imaging is completed, sooner if there is an issue.  All questions were answered.

## 2021-04-27 NOTE — ASSESSMENT
[FreeTextEntry1] : data reviewed: \par 2-12-21\par RF, C3,C4, anti ccp, DS-DNA, ANCA -> negatie\par KAYLYNN - > 1:80, speckled\par \par Impression:\par dyspnea with reduced exercise tolerance of unclear cause. She has CKD with microscopic hematuria, the cause of which is unclear. \par \par - obtain PFTs, 6MWT, exhaled NO, ECHO\par - RTC in 3 weeks

## 2021-04-27 NOTE — PHYSICAL EXAM
[No Acute Distress] : no acute distress [Supple] : supple [Normal Rate/Rhythm] : normal rate/rhythm [Normal S1, S2] : normal s1, s2 [No Resp Distress] : no resp distress [Clear to Auscultation Bilaterally] : clear to auscultation bilaterally [Benign] : benign [Normal Gait] : normal gait [No Clubbing] : no clubbing [No Edema] : no edema [Normal Color/ Pigmentation] : normal color/ pigmentation [Oriented x3] : oriented x3

## 2021-04-27 NOTE — REVIEW OF SYSTEMS
[Fatigue] : fatigue [Dyspnea] : dyspnea [SOB on Exertion] : sob on exertion [Orthopnea] : orthopnea [PND] : PND [Back Pain] : back pain [Chronic Pain] : chronic pain [Obesity] : obesity [Negative] : Gastrointestinal [Fever] : no fever [Chills] : no chills [Cough] : no cough [Hemoptysis] : no hemoptysis [Chest Tightness] : no chest tightness [Sputum] : no sputum [Chest Discomfort] : no chest discomfort [Edema] : no edema [Headache] : no headache [Dizziness] : no dizziness [Numbness] : no numbness [Diabetes] : no diabetes [Thyroid Problem] : no thyroid problem

## 2021-04-27 NOTE — PHYSICAL EXAM
[de-identified] : General: patient is well developed, well nourished, in no acute \par distress, alert and oriented x 3. \par \par Mood and affect: normal\par \par Respiratory: no respiratory distress noted\par \par Skin: no scars over spine, skin intact, no erythema, increased warmth\par \par Alignment:The spine is well compensated in the coronal and sagittal plane.  \par \par Gait: The patient is able to toe walk and heel walk without difficulty. \par \par Palpation: +ttp right lumbar paraspinal region\par \par Range of motion: Lumbar spine ROM is restricted\par \par Neurologic Exam:\par Motor: Manual Muscle testing in the lower extremities is 5 out of 5 in all muscle groups. There is no evidence of muscular atrophy in the lower extremities. Sensory: Sensation to light touch is grossly intact in the lower extremities\par \par Reflexes: DTR are present and symmetric throughout\par \par Hip Exam: No pain with internal or external rotation of hips bilaterally\par \par Special tests: Straight leg raise test negative.  Cross straight leg test negative.  \par \par Vascular: Examination of the peripheral vascular system demonstrates no evidence of congestion or edema. no evidence of lymphedema bilateral lower extremities, pulses are present and symmetric in both lower extremities.\par \par  [de-identified] : XR 4/20/21: vertebral body and disc space heights well preserved, no dynamic instability, no significant scoliosis, no fractures seen
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
## 2021-04-27 NOTE — CONSULT LETTER
[Dear  ___] : Dear  [unfilled], [Consult Letter:] : I had the pleasure of evaluating your patient, [unfilled]. [Please see my note below.] : Please see my note below. [Consult Closing:] : Thank you very much for allowing me to participate in the care of this patient.  If you have any questions, please do not hesitate to contact me. [Sincerely,] : Sincerely, [DrLzi  ___] : Dr. CALLAHAN [FreeTextEntry3] : Dr. Linder

## 2021-04-27 NOTE — HISTORY OF PRESENT ILLNESS
[TextBox_4] : 45yo AA F, never smoker,  s/p laparoscopic sleeve gastrectomy '17, with migraine h/a (on topiramate in the past), h/o non-obstructing L nephrolithiasis '18 passed asymptomatically, HTN, CKD II, lumbar radiculopathy with complains of dyspnea and ?asthma diagnosis. \par Patient reports that one year ago she started noticing that she was short of breath with walking and she initially thought that it was because she was tired and weak however since then her exercise tolerance has significantly decreased. In the last few months she gets tired and is out of breath with walking 1 block. for the last 2 months she has also had to sleep with the head of bed raised. Reports that "her breathing is particularly bad at night if the room is cold. \par Of note: Beginning of this year, she was at her PCP's office when she acutely felt dyspneic and was admitted to [facility] where she was treated for asthma exacerbation. \par \par SH: never smoker, no alcohol or drug use. no pets. Born in [facility] and visits there yearly.

## 2021-05-04 ENCOUNTER — APPOINTMENT (OUTPATIENT)
Dept: ORTHOPEDIC SURGERY | Facility: CLINIC | Age: 46
End: 2021-05-04
Payer: COMMERCIAL

## 2021-05-04 VITALS — TEMPERATURE: 97.8 F

## 2021-05-04 DIAGNOSIS — M54.16 RADICULOPATHY, LUMBAR REGION: ICD-10-CM

## 2021-05-04 PROCEDURE — 99214 OFFICE O/P EST MOD 30 MIN: CPT

## 2021-05-11 ENCOUNTER — NON-APPOINTMENT (OUTPATIENT)
Age: 46
End: 2021-05-11

## 2021-05-19 ENCOUNTER — APPOINTMENT (OUTPATIENT)
Dept: PHYSICAL MEDICINE AND REHAB | Facility: CLINIC | Age: 46
End: 2021-05-19

## 2021-06-03 ENCOUNTER — APPOINTMENT (OUTPATIENT)
Dept: PULMONOLOGY | Facility: CLINIC | Age: 46
End: 2021-06-03

## 2021-07-28 ENCOUNTER — LABORATORY RESULT (OUTPATIENT)
Age: 46
End: 2021-07-28

## 2021-07-28 ENCOUNTER — APPOINTMENT (OUTPATIENT)
Dept: NEPHROLOGY | Facility: CLINIC | Age: 46
End: 2021-07-28
Payer: COMMERCIAL

## 2021-07-28 VITALS
HEART RATE: 60 BPM | SYSTOLIC BLOOD PRESSURE: 130 MMHG | RESPIRATION RATE: 12 BRPM | DIASTOLIC BLOOD PRESSURE: 86 MMHG | OXYGEN SATURATION: 100 %

## 2021-07-28 VITALS — DIASTOLIC BLOOD PRESSURE: 84 MMHG | SYSTOLIC BLOOD PRESSURE: 128 MMHG

## 2021-07-28 DIAGNOSIS — R30.0 DYSURIA: ICD-10-CM

## 2021-07-28 PROCEDURE — 99214 OFFICE O/P EST MOD 30 MIN: CPT

## 2021-07-28 NOTE — PHYSICAL EXAM
[General Appearance - Alert] : alert [General Appearance - In No Acute Distress] : in no acute distress [Extraocular Movements] : extraocular movements were intact [Outer Ear] : the ears and nose were normal in appearance [Jugular Venous Distention Increased] : there was no jugular-venous distention [Respiration, Rhythm And Depth] : normal respiratory rhythm and effort [Exaggerated Use Of Accessory Muscles For Inspiration] : no accessory muscle use [Heart Rate And Rhythm] : heart rate was normal and rhythm regular [Edema] : there was no peripheral edema [Abnormal Walk] : normal gait [Musculoskeletal - Swelling] : no joint swelling seen [] : no rash [Oriented To Time, Place, And Person] : oriented to person, place, and time

## 2021-07-28 NOTE — HISTORY OF PRESENT ILLNESS
[FreeTextEntry1] : \par 45yo AA F s/p laparoscopic sleeve gastrectomy '17, with migraine h/a (on topiramate in the past), h/o non-obstructing L nephrolithiasis '18 passed asymptomatically, HTN dx around 16 years old, CKD II with non-nephrotic proteinuria and  microscopic hematuria of unclear etiology here for f/u \par \par PCP Dr Baugh\par \par Spent the last few weeks in [facility] visiting family. Thinks she gained weight. Legs feel heavy when she walks. Had fall in the snow in Feb and still with persistent back pain (albeit improved) s/p PT, prn Tylenol no NSAIDs. \par No edema. \par Checked BP a few times at home before leaving for [place] but she can't remember #s. \par \par HTN Hx:\par Placed on BP meds about 2 years ago despite having HTN dx in her teens, per pt wasn't requiring medication as it was controlled. Morbid Obesity in past as well s/p sleeve gastrectomy\par \par DMII Hx: no true DM per pt, prediabetes resolved s/p sleeve, A1C's in 5's since 2017\par \par Hematuria Hx:\par First in 2016 (once), then reoccurred in Oct 2018, microscopic hematuria on routine analysis with PCP. Never seen gross hematuria, no urinary sx, no dysuria. She thinks she's had a kidney stone "a long time ago" she had flank pain, told she had a stone and to drink a  lot of water, passed it (visualized but lost in toilet). \par Has mild occ lower back pain, non localizing, never experienced pain in flank similar to episode of renal stone years ago. \par \par Fam Hx: all her family with HTN, no other medical problem. 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 No etoh/drugs

## 2021-07-28 NOTE — ASSESSMENT
[FreeTextEntry1] : 45yo AA F s/p laparoscopic sleeve gastrectomy '17, with migraine h/a (on topiramate in the past), h/o non-obstructing L nephrolithiasis '18 passed asymptomatically, HTN dx around 16 years old, CKD II with non-nephrotic proteinuria and  microscopic hematuria of unclear etiology here for f/u\par \par # CKD II with non-nephrotic proteinuria and microscopic hematuria\par - Cr baseline 0.9-1, 1.2 eGFR 54-62 2/2 starting Telmisartan, Cystatin C egfr 88\par - reviewed April labs, Cr 0.8 ACR 0.3, PCR 0.5 alb 3.5.  cystatin C based eGFR 97 -> stable renal fx and low grade proteinuria, defer renal biopsy for now\par - serologic GN w/u incl antiphospholipids and paraproteinemia all negative. Renal sono 2018 R 11cm L 10cm with 0.4cm non obstructing calculus and 1.2cm simple cyst, normal bladder. CT Urogram 2/21 mild L cortical scarring and some small simple cysts, no stones, no urinary retention. Normal adrenals. Full urologic w/u for hematuria is negative incl cystoscopy and urine cytology. \par - ?IgA Nephropathy ?hypertensive nephrosclerosis, can occasionally have some hematuria although less likely nelly as she has largely only had prediabetes. She has a myriad of MSK sx, and intermittent leukopenia/anemia suggesting a possible systemic process such as SLE or a monoclonal paraproteinemia. \par \par # HTN - reviewed repeat radha 7 PRA 0.9  - prior radha 30, PRA 0.5, no other stigmata of hyperaldosteronism. Adrenals normal on CT 2/21. Above goal today, advised to lose weight, exercise and get back on her low Na diet\par \par Plan - recheck renal fx and urine protein in 4 months. If proteinuria or renal function worsens, may benefit from renal biopsy. \par - f/u with her hematologist and PCP\par - check urine culture today for dysuria\par

## 2021-07-29 ENCOUNTER — LABORATORY RESULT (OUTPATIENT)
Age: 46
End: 2021-07-29

## 2021-07-29 LAB
APPEARANCE: ABNORMAL
BILIRUBIN URINE: NEGATIVE
BLOOD URINE: ABNORMAL
COLOR: YELLOW
GLUCOSE QUALITATIVE U: NEGATIVE
KETONES URINE: NEGATIVE
LEUKOCYTE ESTERASE URINE: NEGATIVE
NITRITE URINE: NEGATIVE
PH URINE: 6.5
PROTEIN URINE: ABNORMAL
SPECIFIC GRAVITY URINE: 1.03
UROBILINOGEN URINE: NORMAL

## 2021-08-13 NOTE — PHYSICAL EXAM
[de-identified] : General: patient is well developed, well nourished, in no acute \par distress, alert and oriented x 3. \par \par Mood and affect: normal\par \par Respiratory: no respiratory distress noted\par \par Skin: no scars over spine, skin intact, no erythema, increased warmth\par \par Alignment:The spine is well compensated in the coronal and sagittal plane.  \par \par Gait: The patient is able to toe walk and heel walk without difficulty. \par \par Palpation: +ttp right lumbar paraspinal region\par \par Range of motion: Lumbar spine ROM is restricted\par \par Neurologic Exam:\par Motor: Manual Muscle testing in the lower extremities is 5 out of 5 in all muscle groups. There is no evidence of muscular atrophy in the lower extremities. Sensory: Sensation to light touch is grossly intact in the lower extremities\par \par Reflexes: DTR are present and symmetric throughout\par \par Hip Exam: No pain with internal or external rotation of hips bilaterally\par \par Special tests: Straight leg raise test negative.  Cross straight leg test negative.  \par \par Vascular: Examination of the peripheral vascular system demonstrates no evidence of congestion or edema. no evidence of lymphedema bilateral lower extremities, pulses are present and symmetric in both lower extremities.\par \par  [de-identified] : MRI lumbar 4/2021: L5/S1 disc bulge with moderate bilateral foraminal stenosis; L3/L4 and L4/L5 disc bulges\par \par XR 4/20/21: vertebral body and disc space heights well preserved, no dynamic instability, no significant scoliosis, no fractures seen

## 2021-08-13 NOTE — DISCUSSION/SUMMARY
[de-identified] : Discussed my findings with the patient. There is no indication for surgical intervention at this time. I am recommending an evaluation with Dr. Angela for consideration of L5/S1 CARROLL, referral given. The patient will follow up with me as needed. All questions answered.

## 2021-10-05 ENCOUNTER — OUTPATIENT (OUTPATIENT)
Dept: OUTPATIENT SERVICES | Facility: HOSPITAL | Age: 46
LOS: 1 days | End: 2021-10-05
Payer: COMMERCIAL

## 2021-10-05 DIAGNOSIS — Z90.49 ACQUIRED ABSENCE OF OTHER SPECIFIED PARTS OF DIGESTIVE TRACT: Chronic | ICD-10-CM

## 2021-10-05 DIAGNOSIS — Z90.3 ACQUIRED ABSENCE OF STOMACH [PART OF]: Chronic | ICD-10-CM

## 2021-10-05 DIAGNOSIS — I10 ESSENTIAL (PRIMARY) HYPERTENSION: ICD-10-CM

## 2021-10-05 PROCEDURE — 78452 HT MUSCLE IMAGE SPECT MULT: CPT

## 2021-10-05 PROCEDURE — 78452 HT MUSCLE IMAGE SPECT MULT: CPT | Mod: 26

## 2021-10-05 PROCEDURE — A9500: CPT

## 2021-10-05 PROCEDURE — 93018 CV STRESS TEST I&R ONLY: CPT

## 2021-10-05 PROCEDURE — 93016 CV STRESS TEST SUPVJ ONLY: CPT

## 2021-10-05 PROCEDURE — 93017 CV STRESS TEST TRACING ONLY: CPT

## 2021-10-05 PROCEDURE — A9505: CPT

## 2021-11-18 ENCOUNTER — LABORATORY RESULT (OUTPATIENT)
Age: 46
End: 2021-11-18

## 2021-11-18 ENCOUNTER — APPOINTMENT (OUTPATIENT)
Dept: NEPHROLOGY | Facility: CLINIC | Age: 46
End: 2021-11-18
Payer: COMMERCIAL

## 2021-11-18 VITALS
DIASTOLIC BLOOD PRESSURE: 84 MMHG | SYSTOLIC BLOOD PRESSURE: 127 MMHG | RESPIRATION RATE: 14 BRPM | HEART RATE: 76 BPM | OXYGEN SATURATION: 100 %

## 2021-11-18 PROCEDURE — 99214 OFFICE O/P EST MOD 30 MIN: CPT

## 2021-11-18 NOTE — ASSESSMENT
Next appt 4/22/2020  Last appt 4/15/2020    Refill request for  gabapentin (NEURONTIN) 400 MG capsule (Discontinued) 30 capsule 5 3/4/2019 2/4/2020    Sig - Route: Take 1 capsule by mouth at bedtime. - Oral          Refilled per standing med protocol.     [FreeTextEntry1] : \par 45yo s/p laparoscopic sleeve gastrectomy '17, with migraine h/a (on topiramate in the past), h/o non-obstructing L nephrolithiasis '18 passed asymptomatically, HTN dx around 16 years old, CKD II with non-nephrotic proteinuria and  microscopic hematuria of unclear etiology here for f/u \par \par # CKD II with non-nephrotic proteinuria and microscopic hematuria\par - Cr baseline 0.0.8-1.2 eGFR 54-62 Cystatin C egfr 83-97ml/min, PCR 0.8g in Feb/21\par - serologic GN w/u incl antiphospholipids and paraproteinemia all negative. Renal sono 2018 R 11cm L 10cm with 0.4cm non obstructing calculus and 1.2cm simple cyst, normal bladder. CT Urogram 2/21 mild L cortical scarring and some small simple cysts, no stones, no urinary retention. Normal adrenals. Full urologic w/u for hematuria is negative incl cystoscopy and urine cytology. \par - differentials for her proteinuria/microscopic hematuria include GN such as IgA Nephropathy, obstructive nephropathy +/- hypertensive nephrosclerosis also possible but less likely given proteinuria and hematuria that persisted despite not having stones on Feb imaging. She has a myriad of MSK sx, and intermittent leukopenia/anemia suggesting a possible systemic process such as SLE or a monoclonal paraproteinemia however the serologic w/u was negative.\par - discussed risks/benefits of kidney biopsy including bleeding, obstruction, offered option of surveillance of proteinuria and renal function however she is anxious and would like to proceed with kidney biopsy. She understands that the current management of her disease may not change with kidney biopsy given only mildly active urinary sediment and relatively normal renal fx. Not taking blood thinners\par \par # HTN - well controlled without medications. Intermittently elevated radha ~7-30 with non suppressed PRA and normal adrenals make primary hyperaldosteronism less likely. \par \par Plan \par - will schedule kidney biopsy for January\par - check urine PCR today, if still elevated she is agreeable to restarting HERBER blockade. \par \par \par

## 2021-11-18 NOTE — HISTORY OF PRESENT ILLNESS
[FreeTextEntry1] : \par 47yo s/p laparoscopic sleeve gastrectomy '17, with migraine h/a (on topiramate in the past), h/o non-obstructing L nephrolithiasis '18 passed asymptomatically, HTN dx around 16 years old, CKD II with non-nephrotic proteinuria and  microscopic hematuria of unclear etiology here for f/u \par \par PCP Dr Baugh\par Hematologist Dr Kevin Davenport\par \par Not checking BP at home, at office visit with cardiologist Dr. Rafal Moreno bps 110/75. \par Feels well other than chronic stable lower back pain, PT Not helpding. Doesn't take nsaids, rare tylenol. admits to not taking any of her medications other than iron tablets\par \par DMII Hx: no true DM per pt, prediabetes resolved s/p sleeve, A1C's in 5's since 2017\par \par Hematuria Hx:\par First in 2016 (once), then reoccurred in Oct 2018, microscopic hematuria on routine analysis with PCP. Never seen gross hematuria, no urinary sx, no dysuria. She thinks she's had a kidney stone "a long time ago" she had flank pain, told she had a stone and to drink a  lot of water, passed it (visualized but lost in toilet). \par \par Fam Hx: all her family with HTN, no other medical problem. 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 No etoh/drugs

## 2021-11-18 NOTE — PHYSICAL EXAM
[General Appearance - Alert] : alert [General Appearance - In No Acute Distress] : in no acute distress [Extraocular Movements] : extraocular movements were intact [Outer Ear] : the ears and nose were normal in appearance [Jugular Venous Distention Increased] : there was no jugular-venous distention [Respiration, Rhythm And Depth] : normal respiratory rhythm and effort [Exaggerated Use Of Accessory Muscles For Inspiration] : no accessory muscle use [Heart Rate And Rhythm] : heart rate was normal and rhythm regular [Edema] : there was no peripheral edema [Abnormal Walk] : normal gait [Musculoskeletal - Swelling] : no joint swelling seen [] : no rash [Oriented To Time, Place, And Person] : oriented to person, place, and time [Auscultation Breath Sounds / Voice Sounds] : lungs were clear to auscultation bilaterally

## 2021-12-10 LAB
APPEARANCE: CLEAR
APPEARANCE: CLEAR
BACTERIA: NEGATIVE
BILIRUBIN URINE: NEGATIVE
BILIRUBIN URINE: NEGATIVE
BLOOD URINE: ABNORMAL
BLOOD URINE: ABNORMAL
COLOR: YELLOW
COLOR: YELLOW
CREAT SPEC-SCNC: 324 MG/DL
CREAT SPEC-SCNC: 324 MG/DL
CREAT/PROT UR: 0.8 RATIO
GLUCOSE QUALITATIVE U: NEGATIVE
GLUCOSE QUALITATIVE U: NEGATIVE
HYALINE CASTS: 1 /LPF
KETONES URINE: NEGATIVE
KETONES URINE: NEGATIVE
LEUKOCYTE ESTERASE URINE: NEGATIVE
LEUKOCYTE ESTERASE URINE: NEGATIVE
MICROALBUMIN 24H UR DL<=1MG/L-MCNC: 134 MG/DL
MICROALBUMIN/CREAT 24H UR-RTO: 414 MG/G
MICROSCOPIC-UA: NORMAL
NITRITE URINE: NEGATIVE
NITRITE URINE: NEGATIVE
PH URINE: 7
PH URINE: 7
PROT UR-MCNC: 242 MG/DL
PROTEIN URINE: ABNORMAL
PROTEIN URINE: ABNORMAL
RED BLOOD CELLS URINE: 80 /HPF
SPECIFIC GRAVITY URINE: 1.03
SPECIFIC GRAVITY URINE: 1.03
SQUAMOUS EPITHELIAL CELLS: 3 /HPF
UROBILINOGEN URINE: NORMAL
UROBILINOGEN URINE: NORMAL
WHITE BLOOD CELLS URINE: 2 /HPF

## 2022-01-27 ENCOUNTER — LABORATORY RESULT (OUTPATIENT)
Age: 47
End: 2022-01-27

## 2022-01-27 ENCOUNTER — APPOINTMENT (OUTPATIENT)
Dept: NEPHROLOGY | Facility: CLINIC | Age: 47
End: 2022-01-27
Payer: COMMERCIAL

## 2022-01-27 VITALS
RESPIRATION RATE: 16 BRPM | OXYGEN SATURATION: 100 % | SYSTOLIC BLOOD PRESSURE: 144 MMHG | HEART RATE: 64 BPM | DIASTOLIC BLOOD PRESSURE: 84 MMHG

## 2022-01-27 VITALS — SYSTOLIC BLOOD PRESSURE: 137 MMHG | DIASTOLIC BLOOD PRESSURE: 88 MMHG

## 2022-01-27 DIAGNOSIS — D72.819 DECREASED WHITE BLOOD CELL COUNT, UNSPECIFIED: ICD-10-CM

## 2022-01-27 DIAGNOSIS — E55.9 VITAMIN D DEFICIENCY, UNSPECIFIED: ICD-10-CM

## 2022-01-27 DIAGNOSIS — R73.03 PREDIABETES.: ICD-10-CM

## 2022-01-27 PROCEDURE — 99214 OFFICE O/P EST MOD 30 MIN: CPT

## 2022-01-27 RX ORDER — GABAPENTIN 300 MG/1
300 CAPSULE ORAL
Qty: 60 | Refills: 0 | Status: DISCONTINUED | COMMUNITY
Start: 2021-04-20 | End: 2022-01-27

## 2022-01-27 NOTE — ASSESSMENT
[FreeTextEntry1] : 47yo s/p laparoscopic sleeve gastrectomy '17, with migraine h/a (on topiramate in the past), h/o non-obstructing L nephrolithiasis '18 passed asymptomatically, HTN dx around 16 years old, CKD II with non-nephrotic proteinuria and  microscopic hematuria of unclear etiology here for f/u \par \par # CKD II with non-nephrotic proteinuria and microscopic hematuria\par - Cr baseline 0.0.8-1.2 eGFR 54-62 Cystatin C egfr 83-97ml/min, PCR 0.8g in Dec/21\par - serologic GN w/u incl antiphospholipids and paraproteinemia all negative. Renal sono 2018 R 11cm L 10cm with 0.4cm non obstructing calculus and 1.2cm simple cyst, normal bladder. CT Urogram 2/21 mild L cortical scarring and some small simple cysts, no stones, no urinary retention. Normal adrenals. Full urologic w/u for hematuria is negative incl cystoscopy and urine cytology. \par - differentials for her proteinuria/microscopic hematuria include GN such as IgA Nephropathy, obstructive nephropathy +/- hypertensive nephrosclerosis also possible but less likely given proteinuria and hematuria that persisted despite not having stones on Feb imaging. She has a myriad of MSK sx, and intermittent leukopenia/anemia suggesting a possible systemic process such as SLE or a monoclonal paraproteinemia however the serologic w/u was negative.\par - discussed risks/benefits of kidney biopsy including bleeding, obstruction, offered option of surveillance of proteinuria and renal function however she is anxious and would like to proceed with kidney biopsy. She understands that the current management of her disease may not change with kidney biopsy given only mildly active urinary sediment and relatively normal renal fx. Not taking blood thinners\par \par # HTN - uncontrolled. Intermittently elevated radha ~7-30 with non suppressed PRA and normal adrenals make primary hyperaldosteronism less likely. \par \par Plan \par - check renal function, coags/Plts, urine PCR\par - restart Telmisartan 20mg daily for blood pressure and proteinuria, explained importance of this medication in controlled proteinuria \par -  based off labs we will likely schedule kidney biopsy\par \par \par \par

## 2022-01-27 NOTE — HISTORY OF PRESENT ILLNESS
[FreeTextEntry1] : \par 45yo s/p laparoscopic sleeve gastrectomy '17, with migraine h/a (on topiramate in the past), h/o non-obstructing L nephrolithiasis '18 passed asymptomatically, HTN dx around 16 years old, CKD II with non-nephrotic proteinuria and  microscopic hematuria of unclear etiology here for f/u \par \par PCP Dr Baugh\par Hematologist Dr Kevin Davenport\par \par Had a tummy tuck two weeks ago, healing well. No other health issues, stable weight. No joint pain, rashes, mouth ulcers etc. Oxycodone and prn tylenol for pain. No nsaids.\par Admits to not taking Telmisartan. \par \par DMII Hx: no true DM per pt, prediabetes resolved s/p sleeve, A1C's in 5's since 2017\par \par Hematuria Hx:\par First in 2016 (once), then reoccurred in Oct 2018, microscopic hematuria on routine analysis with PCP. Never seen gross hematuria, no urinary sx, no dysuria. She thinks she's had a kidney stone "a long time ago" she had flank pain, told she had a stone and to drink a  lot of water, passed it (visualized but lost in toilet). \par \par Fam Hx: all her family with HTN, no other medical problem. 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 No etoh/drugs. Mother lives in [place].

## 2022-01-27 NOTE — PHYSICAL EXAM
[General Appearance - Alert] : alert [General Appearance - In No Acute Distress] : in no acute distress [Extraocular Movements] : extraocular movements were intact [Outer Ear] : the ears and nose were normal in appearance [Jugular Venous Distention Increased] : there was no jugular-venous distention [Respiration, Rhythm And Depth] : normal respiratory rhythm and effort [Exaggerated Use Of Accessory Muscles For Inspiration] : no accessory muscle use [Auscultation Breath Sounds / Voice Sounds] : lungs were clear to auscultation bilaterally [Heart Rate And Rhythm] : heart rate was normal and rhythm regular [Edema] : there was no peripheral edema [Abnormal Walk] : normal gait [Musculoskeletal - Swelling] : no joint swelling seen [] : no rash [Oriented To Time, Place, And Person] : oriented to person, place, and time

## 2022-02-14 NOTE — PROGRESS NOTE ADULT - PROVIDER SPECIALTY LIST ADULT
Bariatric Surgery Madina is a 17 year old who is being evaluated via a billable video visit.      How would you like to obtain your AVS? Mail a copy  If the video visit is dropped, the invitation should be resent by: Text to cell phone: [phone]  Will anyone else be joining your video visit? Yes: mother . How would they like to receive their invitation? Text to cell phone: same    Video Start Time: 10:14 AM  10:19 AM has not signed on yet, called, said she just got the link, will try to connect    Assessment & Plan   Madina was seen today for pharyngitis and health maintenance.    Diagnoses and all orders for this visit:    Viral URI  -     Streptococcus A Rapid Screen w/Reflex to PCR - Clinic Collect    reasonable to get checked. Centor of 2 or 3. We reviewed isolation precautions, getting covid tested, but she wants to do that through school    Follow Up  Return if symptoms worsen or fail to improve.    Laura Robles MD              Subjective   Madina is a 17 year old who presents for the following health issues accompanied by her mother.    HPI   Chief Complaint   Patient presents with     Pharyngitis     Health Maintenance     Patient could not do PHQ9 with phone call      ENT Symptoms           Symptoms: cc Present Absent Comment   Fever/Chills   x    Fatigue   x    Muscle Aches   x    Eye Irritation   x    Sneezing   x    Nasal Dustin/Drg  x     Sinus Pressure/Pain   x    Loss of smell   x    Dental pain   x    Sore Throat  x     Swollen Glands   x    Ear Pain/Fullness   x    Cough   x    Wheeze   x    Chest Pain   x    Shortness of breath   x    Rash   x    Other         Symptom duration:  yesterday   Symptom severity:  moderate   Treatments tried:  none    Contacts:  friend - not sure if strep but also sick with sore throat       She just recovered from the flu December  Is able to get covid tested at school, prefers that  Thinks she has a very red throat    Review of Systems   As above      Objective    Vitals -  Patient Reported  Pain Score: Severe Pain (6)  Vitals:  No vitals were obtained today due to virtual visit.    Physical Exam   Exam limited by virtual visit  GENERAL: Active, alert, in no acute distress.  SKIN: Clear. No significant rash, abnormal pigmentation or lesions  HEAD: Normocephalic.  EYES:  No discharge or erythema. 8  MOUTH/THROAT: sounds a little congested but unable to examine  LUNGS: normal respiratory effort, speaking in complete sentences  MS: no gross musculoskeletal defects noted on limited visual exam  PSYCH: mentation appears normal, affect normal/bright          Video-Visit Details    Type of service:  Video Visit    Video End Time: 10:26 AM    Originating Location (pt. Location): Home    Distant Location (provider location):  [facility]     Platform used for Video Visit: Boardvote

## 2022-03-01 ENCOUNTER — APPOINTMENT (OUTPATIENT)
Dept: UROLOGY | Facility: CLINIC | Age: 47
End: 2022-03-01

## 2022-03-03 LAB
ALBUMIN SERPL ELPH-MCNC: 3.8 G/DL
ALP BLD-CCNC: 59 U/L
ALT SERPL-CCNC: 30 U/L
ANION GAP SERPL CALC-SCNC: 13 MMOL/L
APPEARANCE: CLEAR
APPEARANCE: CLEAR
APTT BLD: 28.4 SEC
AST SERPL-CCNC: 35 U/L
BACTERIA: NEGATIVE
BASOPHILS # BLD AUTO: 0.03 K/UL
BASOPHILS NFR BLD AUTO: 0.6 %
BILIRUB SERPL-MCNC: 0.2 MG/DL
BILIRUBIN URINE: NEGATIVE
BILIRUBIN URINE: NEGATIVE
BLOOD URINE: ABNORMAL
BLOOD URINE: ABNORMAL
BUN SERPL-MCNC: 14 MG/DL
CALCIUM SERPL-MCNC: 8.9 MG/DL
CHLORIDE SERPL-SCNC: 104 MMOL/L
CO2 SERPL-SCNC: 25 MMOL/L
COLOR: NORMAL
COLOR: NORMAL
CREAT SERPL-MCNC: 0.98 MG/DL
CREAT SPEC-SCNC: 156 MG/DL
CREAT/PROT UR: 1 RATIO
CYSTATIN C SERPL-MCNC: 0.94 MG/L
EOSINOPHIL # BLD AUTO: 0.05 K/UL
EOSINOPHIL NFR BLD AUTO: 1.1 %
ESTIMATED AVERAGE GLUCOSE: 108 MG/DL
FERRITIN SERPL-MCNC: 56 NG/ML
GFR/BSA.PRED SERPLBLD CYS-BASED-ARV: 83 ML/MIN/1.73M2
GLUCOSE QUALITATIVE U: NEGATIVE
GLUCOSE QUALITATIVE U: NEGATIVE
GLUCOSE SERPL-MCNC: 75 MG/DL
HBA1C MFR BLD HPLC: 5.4 %
HCT VFR BLD CALC: 34.6 %
HGB BLD-MCNC: 11 G/DL
HYALINE CASTS: 1 /LPF
IMM GRANULOCYTES NFR BLD AUTO: 0.2 %
INR PPP: 0.96 RATIO
IRON SATN MFR SERPL: 25 %
IRON SERPL-MCNC: 62 UG/DL
KETONES URINE: ABNORMAL
KETONES URINE: ABNORMAL
LEUKOCYTE ESTERASE URINE: NEGATIVE
LEUKOCYTE ESTERASE URINE: NEGATIVE
LYMPHOCYTES # BLD AUTO: 1.38 K/UL
LYMPHOCYTES NFR BLD AUTO: 29.5 %
MAN DIFF?: NORMAL
MCHC RBC-ENTMCNC: 26.6 PG
MCHC RBC-ENTMCNC: 31.8 GM/DL
MCV RBC AUTO: 83.6 FL
MICROSCOPIC-UA: NORMAL
MONOCYTES # BLD AUTO: 0.36 K/UL
MONOCYTES NFR BLD AUTO: 7.7 %
NEUTROPHILS # BLD AUTO: 2.85 K/UL
NEUTROPHILS NFR BLD AUTO: 60.9 %
NITRITE URINE: NEGATIVE
NITRITE URINE: NEGATIVE
PH URINE: 6
PH URINE: 6
PLATELET # BLD AUTO: 309 K/UL
POTASSIUM SERPL-SCNC: 4 MMOL/L
PROT SERPL-MCNC: 6.2 G/DL
PROT UR-MCNC: 148 MG/DL
PROTEIN URINE: ABNORMAL
PROTEIN URINE: ABNORMAL
PT BLD: 11.4 SEC
RBC # BLD: 4.14 M/UL
RBC # FLD: 15.3 %
RED BLOOD CELLS URINE: 19 /HPF
SODIUM SERPL-SCNC: 142 MMOL/L
SPECIFIC GRAVITY URINE: 1.02
SPECIFIC GRAVITY URINE: 1.02
SQUAMOUS EPITHELIAL CELLS: 3 /HPF
TIBC SERPL-MCNC: 250 UG/DL
UIBC SERPL-MCNC: 188 UG/DL
UROBILINOGEN URINE: NORMAL
UROBILINOGEN URINE: NORMAL
WBC # FLD AUTO: 4.68 K/UL
WHITE BLOOD CELLS URINE: 2 /HPF

## 2022-03-31 ENCOUNTER — LABORATORY RESULT (OUTPATIENT)
Age: 47
End: 2022-03-31
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
## 2022-05-03 ENCOUNTER — APPOINTMENT (OUTPATIENT)
Dept: UROLOGY | Facility: CLINIC | Age: 47
End: 2022-05-03

## 2022-05-09 ENCOUNTER — APPOINTMENT (OUTPATIENT)
Dept: PULMONOLOGY | Facility: CLINIC | Age: 47
End: 2022-05-09
Payer: COMMERCIAL

## 2022-05-09 VITALS
OXYGEN SATURATION: 98 % | HEART RATE: 91 BPM | WEIGHT: 210 LBS | SYSTOLIC BLOOD PRESSURE: 116 MMHG | BODY MASS INDEX: 34.99 KG/M2 | DIASTOLIC BLOOD PRESSURE: 90 MMHG | TEMPERATURE: 97.7 F | HEIGHT: 65 IN

## 2022-05-09 DIAGNOSIS — R06.00 DYSPNEA, UNSPECIFIED: ICD-10-CM

## 2022-05-09 PROCEDURE — 99214 OFFICE O/P EST MOD 30 MIN: CPT

## 2022-05-09 PROCEDURE — 99072 ADDL SUPL MATRL&STAF TM PHE: CPT

## 2022-05-11 LAB — SARS-COV-2 N GENE NPH QL NAA+PROBE: NOT DETECTED

## 2022-05-12 ENCOUNTER — APPOINTMENT (OUTPATIENT)
Dept: PULMONOLOGY | Facility: CLINIC | Age: 47
End: 2022-05-12
Payer: COMMERCIAL

## 2022-05-12 PROCEDURE — 94729 DIFFUSING CAPACITY: CPT

## 2022-05-12 PROCEDURE — 94060 EVALUATION OF WHEEZING: CPT

## 2022-05-12 PROCEDURE — 95012 NITRIC OXIDE EXP GAS DETER: CPT

## 2022-05-12 PROCEDURE — 99072 ADDL SUPL MATRL&STAF TM PHE: CPT

## 2022-05-12 PROCEDURE — 94727 GAS DIL/WSHOT DETER LNG VOL: CPT

## 2022-05-12 NOTE — CONSULT LETTER
[Dear  ___] : Dear  [unfilled], [Courtesy Letter:] : I had the pleasure of seeing your patient, [unfilled], in my office today. [Please see my note below.] : Please see my note below. [Consult Closing:] : Thank you very much for allowing me to participate in the care of this patient.  If you have any questions, please do not hesitate to contact me. [Sincerely,] : Sincerely, [DrLiz  ___] : Dr. CALLAHAN [FreeTextEntry2] : Terrence Baugh MD\par [address] \[place] [address]\par  [FreeTextEntry3] : Cassidy Stewart MD, FCCP\par

## 2022-05-12 NOTE — HISTORY OF PRESENT ILLNESS
[TextBox_4] : 05/09/2022: Asked to evaluate patient by Dr Baugh for chest pain and dyspnea. Not actually a new referral - was seen by Dr Linder a year ago for dyspnea and of note, had been admitted to [facility] earlier in 2021 and treated for asthma exacerbation. Dr Linder ordered PFT, 6MWT, FENO and echo. None of this was done. She is returning with off and on chest pain and cough. She had these symptoms even before Covid, but since having Covid in March 2022 the cough is worse. She remains dyspneic on exertion, can't walk a long distance. She had no childhood asthma and she does not use the pump she was given at [facility] because she's not sure she has asthma. Never smoker. 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 Very fatigued with the physical aspect of the job.\par

## 2022-05-12 NOTE — ASSESSMENT
[FreeTextEntry1] : Data reviewed:\par \par PA/lat CXR [facility] 4/1/2022 personally reviewed : clear lungs\par \par Impression:\par Dyspnea on exertion, cough\par Covid March 2022 but symptoms preceded Covid\par \par Plan:\par Return for full PFT and FENO.\par --\par PFT 5/12/22: entirely normal, no BD response / FENO 17\par Spoke to her. She did also have a normal stress at [facility] 10/2021 and is mildly anemic (Hgb 11s). Suspect her dyspnea is from her weight. She has set up a small gym and started exercising. I agree w this plan. Focus on exercise, lose some weight if possible. If symptoms improve, great. If symptoms worsen, return and I will send for CPET.

## 2022-06-20 ENCOUNTER — APPOINTMENT (OUTPATIENT)
Dept: NEPHROLOGY | Facility: CLINIC | Age: 47
End: 2022-06-20

## 2022-08-16 LAB
ALBUMIN SERPL ELPH-MCNC: 4 G/DL
ALP BLD-CCNC: 57 U/L
ALT SERPL-CCNC: 11 U/L
ANION GAP SERPL CALC-SCNC: 8 MMOL/L
APPEARANCE: CLEAR
APPEARANCE: CLEAR
APTT BLD: 28.2 SEC
AST SERPL-CCNC: 17 U/L
BACTERIA: NEGATIVE
BASOPHILS # BLD AUTO: 0.02 K/UL
BASOPHILS NFR BLD AUTO: 0.4 %
BILIRUB SERPL-MCNC: 0.2 MG/DL
BILIRUBIN URINE: NEGATIVE
BILIRUBIN URINE: NEGATIVE
BLOOD URINE: ABNORMAL
BLOOD URINE: ABNORMAL
BUN SERPL-MCNC: 15 MG/DL
CALCIUM SERPL-MCNC: 9.1 MG/DL
CHLORIDE SERPL-SCNC: 104 MMOL/L
CO2 SERPL-SCNC: 30 MMOL/L
COLOR: YELLOW
COLOR: YELLOW
CREAT SERPL-MCNC: 1.18 MG/DL
CREAT SPEC-SCNC: 406 MG/DL
CREAT/PROT UR: 0.5 RATIO
CYSTATIN C SERPL-MCNC: 0.92 MG/L
EGFR: 57 ML/MIN/1.73M2
EOSINOPHIL # BLD AUTO: 0.02 K/UL
EOSINOPHIL NFR BLD AUTO: 0.4 %
GFR/BSA.PRED SERPLBLD CYS-BASED-ARV: 85 ML/MIN/1.73M2
GLUCOSE QUALITATIVE U: NEGATIVE
GLUCOSE QUALITATIVE U: NEGATIVE
GLUCOSE SERPL-MCNC: 85 MG/DL
HCT VFR BLD CALC: 37.2 %
HGB BLD-MCNC: 11.5 G/DL
HYALINE CASTS: 0 /LPF
IMM GRANULOCYTES NFR BLD AUTO: 0.4 %
INR PPP: 0.98 RATIO
KETONES URINE: NEGATIVE
KETONES URINE: NEGATIVE
LEUKOCYTE ESTERASE URINE: NEGATIVE
LEUKOCYTE ESTERASE URINE: NEGATIVE
LYMPHOCYTES # BLD AUTO: 1.32 K/UL
LYMPHOCYTES NFR BLD AUTO: 25.5 %
MAN DIFF?: NORMAL
MCHC RBC-ENTMCNC: 26 PG
MCHC RBC-ENTMCNC: 30.9 GM/DL
MCV RBC AUTO: 84.2 FL
MICROSCOPIC-UA: NORMAL
MONOCYTES # BLD AUTO: 0.38 K/UL
MONOCYTES NFR BLD AUTO: 7.4 %
NEUTROPHILS # BLD AUTO: 3.41 K/UL
NEUTROPHILS NFR BLD AUTO: 65.9 %
NITRITE URINE: NEGATIVE
NITRITE URINE: NEGATIVE
PH URINE: 6
PH URINE: 6
PLATELET # BLD AUTO: 290 K/UL
POTASSIUM SERPL-SCNC: 4.1 MMOL/L
PROT SERPL-MCNC: 6.5 G/DL
PROT UR-MCNC: 218 MG/DL
PROTEIN URINE: ABNORMAL
PROTEIN URINE: ABNORMAL
PT BLD: 11.4 SEC
RBC # BLD: 4.42 M/UL
RBC # FLD: 14.4 %
RED BLOOD CELLS URINE: 12 /HPF
SODIUM SERPL-SCNC: 142 MMOL/L
SPECIFIC GRAVITY URINE: 1.03
SPECIFIC GRAVITY URINE: 1.03
SQUAMOUS EPITHELIAL CELLS: 7 /HPF
UROBILINOGEN URINE: NORMAL
UROBILINOGEN URINE: NORMAL
WBC # FLD AUTO: 5.17 K/UL
WHITE BLOOD CELLS URINE: 3 /HPF

## 2022-09-27 LAB
ALBUMIN SERPL ELPH-MCNC: 4.1 G/DL
ANION GAP SERPL CALC-SCNC: 12 MMOL/L
APPEARANCE: CLEAR
BACTERIA: NEGATIVE
BILIRUBIN URINE: NEGATIVE
BLOOD URINE: ABNORMAL
BUN SERPL-MCNC: 16 MG/DL
CALCIUM SERPL-MCNC: 9.7 MG/DL
CHLORIDE SERPL-SCNC: 104 MMOL/L
CO2 SERPL-SCNC: 28 MMOL/L
COLOR: YELLOW
CREAT SERPL-MCNC: 1 MG/DL
CREAT SPEC-SCNC: 54 MG/DL
CREAT/PROT UR: 1.6 RATIO
EGFR: 70 ML/MIN/1.73M2
GLUCOSE QUALITATIVE U: NEGATIVE
GLUCOSE SERPL-MCNC: 79 MG/DL
HYALINE CASTS: 0 /LPF
KETONES URINE: NEGATIVE
LEUKOCYTE ESTERASE URINE: NEGATIVE
MICROSCOPIC-UA: NORMAL
NITRITE URINE: NEGATIVE
PH URINE: 6.5
PHOSPHATE SERPL-MCNC: 3.3 MG/DL
POTASSIUM SERPL-SCNC: 4.5 MMOL/L
PROT UR-MCNC: 84 MG/DL
PROTEIN URINE: ABNORMAL
RED BLOOD CELLS URINE: 25 /HPF
SODIUM SERPL-SCNC: 144 MMOL/L
SPECIFIC GRAVITY URINE: 1.01
SQUAMOUS EPITHELIAL CELLS: 5 /HPF
URATE SERPL-MCNC: 4.7 MG/DL
UROBILINOGEN URINE: NORMAL
WHITE BLOOD CELLS URINE: 2 /HPF

## 2022-09-28 ENCOUNTER — APPOINTMENT (OUTPATIENT)
Dept: NEPHROLOGY | Facility: CLINIC | Age: 47
End: 2022-09-28

## 2022-10-05 ENCOUNTER — APPOINTMENT (OUTPATIENT)
Dept: NEPHROLOGY | Facility: CLINIC | Age: 47
End: 2022-10-05

## 2022-10-05 VITALS
HEIGHT: 60 IN | WEIGHT: 218 LBS | HEART RATE: 73 BPM | BODY MASS INDEX: 42.8 KG/M2 | OXYGEN SATURATION: 98 % | DIASTOLIC BLOOD PRESSURE: 89 MMHG | SYSTOLIC BLOOD PRESSURE: 130 MMHG

## 2022-10-05 DIAGNOSIS — D50.9 IRON DEFICIENCY ANEMIA, UNSPECIFIED: ICD-10-CM

## 2022-10-05 DIAGNOSIS — G43.909 MIGRAINE, UNSPECIFIED, NOT INTRACTABLE, W/OUT STATUS MIGRAINOSUS: ICD-10-CM

## 2022-10-05 PROCEDURE — 99072 ADDL SUPL MATRL&STAF TM PHE: CPT

## 2022-10-05 PROCEDURE — 99214 OFFICE O/P EST MOD 30 MIN: CPT

## 2022-10-05 RX ORDER — TELMISARTAN 20 MG/1
20 TABLET ORAL DAILY
Qty: 90 | Refills: 3 | Status: DISCONTINUED | COMMUNITY
Start: 2021-02-12 | End: 2022-10-05

## 2022-10-05 RX ORDER — CYCLOBENZAPRINE HYDROCHLORIDE 5 MG/1
5 TABLET, FILM COATED ORAL 3 TIMES DAILY
Qty: 30 | Refills: 0 | Status: DISCONTINUED | COMMUNITY
Start: 2021-04-20 | End: 2022-10-05

## 2022-10-05 RX ORDER — GLUC/MSM/COLGN2/HYAL/ANTIARTH3 375-375-20
TABLET ORAL
Refills: 0 | Status: DISCONTINUED | COMMUNITY
End: 2022-10-05

## 2022-10-17 NOTE — HISTORY OF PRESENT ILLNESS
[FreeTextEntry1] : \par 48yo s/p laparoscopic sleeve gastrectomy '17, with migraine h/a (on topiramate in the past), h/o non-obstructing L nephrolithiasis '18 passed asymptomatically, HTN dx around 16 years old, CKD II with non-nephrotic proteinuria and  microscopic hematuria of unclear etiology here for f/u \par \par PCP Dr Baugh\par Hematologist Dr Kevin Davenport\par \par Migraines came back. Stopped Telmisartan due to cost. \par \par DMII Hx: no true DM per pt, prediabetes resolved s/p sleeve, A1C's in 5's since 2017\par \par Hematuria Hx:\par First in 2016 (once), then reoccurred in Oct 2018, microscopic hematuria on routine analysis with PCP. Never seen gross hematuria, no urinary sx, no dysuria. She thinks she's had a kidney stone "a long time ago" she had flank pain, told she had a stone and to drink a  lot of water, passed it (visualized but lost in toilet). \par \par Fam Hx: all her family with HTN, no other medical problem. 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 No etoh/drugs. Mother lives in [place].

## 2022-10-17 NOTE — ASSESSMENT
[FreeTextEntry1] : 47yo s/p laparoscopic sleeve gastrectomy '17, with migraine h/a (on topiramate in the past), h/o non-obstructing L nephrolithiasis '18 passed asymptomatically, HTN dx around 16 years old, CKD II with non-nephrotic proteinuria and  microscopic hematuria of unclear etiology here for f/u \par \par # CKD II with non-nephrotic proteinuria and microscopic hematuria\par - Cr baseline 0.0.8-1.2 eGFR 54-62 Cystatin C egfr 83-97ml/min, PCR 0.8g in Dec/21\par - serologic GN w/u incl antiphospholipids and paraproteinemia all negative. Renal sono 2018 R 11cm L 10cm with 0.4cm non obstructing calculus and 1.2cm simple cyst, normal bladder. CT Urogram 2/21 mild L cortical scarring and some small simple cysts, no stones, no urinary retention. Normal adrenals. Full urologic w/u for hematuria is negative incl cystoscopy and urine cytology. \par - differentials for her proteinuria/microscopic hematuria include GN such as IgA Nephropathy, obstructive nephropathy +/- hypertensive nephrosclerosis also possible but less likely given proteinuria and hematuria that persisted despite not having stones on Feb imaging. She has a myriad of MSK sx, and intermittent leukopenia/anemia suggesting a possible systemic process such as SLE or a monoclonal paraproteinemia however the serologic w/u was negative.\par - discussed risks/benefits of kidney biopsy including bleeding, obstruction, offered option of surveillance of proteinuria and renal function however she is anxious and would like to proceed with kidney biopsy. She understands that the current management of her disease may not change with kidney biopsy given only mildly active urinary sediment and relatively normal renal fx. Not taking blood thinners\par \par # HTN - uncontrolled. Intermittently elevated radha ~7-30 with non suppressed PRA and normal adrenals make primary hyperaldosteronism less likely. \par \par Plan \par - check renal function, coags/Plts, urine PCR\par - restart Telmisartan \par - plan for non-urgent renal biopsy per pt's request when she has new insurance. \par \par -  based off labs we will likely schedule kidney biopsy\par \par \par \par

## 2023-03-02 ENCOUNTER — APPOINTMENT (OUTPATIENT)
Dept: PULMONOLOGY | Facility: CLINIC | Age: 48
End: 2023-03-02
Payer: COMMERCIAL

## 2023-03-02 VITALS
WEIGHT: 215 LBS | BODY MASS INDEX: 42.21 KG/M2 | SYSTOLIC BLOOD PRESSURE: 110 MMHG | TEMPERATURE: 97.3 F | HEART RATE: 74 BPM | HEIGHT: 60 IN | DIASTOLIC BLOOD PRESSURE: 68 MMHG | OXYGEN SATURATION: 98 %

## 2023-03-02 PROCEDURE — 99072 ADDL SUPL MATRL&STAF TM PHE: CPT

## 2023-03-02 PROCEDURE — 99214 OFFICE O/P EST MOD 30 MIN: CPT

## 2023-03-02 RX ORDER — OMEPRAZOLE 20 MG/1
20 CAPSULE, DELAYED RELEASE ORAL DAILY
Qty: 1 | Refills: 2 | Status: ACTIVE | COMMUNITY
Start: 2023-03-02 | End: 1900-01-01

## 2023-03-02 NOTE — HISTORY OF PRESENT ILLNESS
[TextBox_4] : 05/09/2022: Asked to evaluate patient by Dr Baugh for chest pain and dyspnea. Not actually a new referral - was seen by Dr Linder a year ago for dyspnea and of note, had been admitted to [facility] earlier in 2021 and treated for asthma exacerbation. Dr Linder ordered PFT, 6MWT, FENO and echo. None of this was done. She is returning with off and on chest pain and cough. She had these symptoms even before Covid, but since having Covid in March 2022 the cough is worse. She remains dyspneic on exertion, can't walk a long distance. She had no childhood asthma and she does not use the pump she was given at [facility] because she's not sure she has asthma. Never smoker. 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 Admitted to hospital in [place] in November for chest pain and nothing was found although she also describes that they got more focused on migraines. Had ECG and CT in the hospital. Denies having a stress or echo. The pain is burning and goes up to her throat. She feels dizzy when she gets up and she feels dyspneic on any exertion. 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 She no longer runs around with the child due to her dyspnea. She sees hematology for anemia and reports that her Hgb was 9 in December and her iron was increased.\par

## 2023-03-02 NOTE — ASSESSMENT
[FreeTextEntry1] : Data reviewed:\par \par PA/lat CXR [facility] 4/1/2022 personally reviewed : clear lungs\par \par PFT 5/12/22: entirely normal, no BD response / FENO 17\par \par Impression:\par Dyspnea on exertion\par Anemia by her report, but symptoms preceded anemia\par Covid March 2022 but symptoms preceded Covid\par Chest pain that sounds like reflux\par \par Plan:\par She has no lung disease by previous evaluation. This may be due to weight, anemia, lack of exercise, but needs echo to exclude structural heart disease, pulmonary vascular disease. If that is abnormal then will address. If it is normal then CPET. Add PPI for reflux.

## 2023-03-02 NOTE — CONSULT LETTER
[Dear  ___] : Dear  [unfilled], [Courtesy Letter:] : I had the pleasure of seeing your patient, [unfilled], in my office today. [Please see my note below.] : Please see my note below. [Consult Closing:] : Thank you very much for allowing me to participate in the care of this patient.  If you have any questions, please do not hesitate to contact me. [Sincerely,] : Sincerely, [FreeTextEntry2] : Terrence Baugh MD\par [address] \[place] [address]\par  [FreeTextEntry3] : Cassidy Stewart MD, FCCP\par

## 2023-03-03 ENCOUNTER — APPOINTMENT (OUTPATIENT)
Dept: NEPHROLOGY | Facility: CLINIC | Age: 48
End: 2023-03-03
Payer: COMMERCIAL

## 2023-03-03 VITALS — HEART RATE: 99 BPM | DIASTOLIC BLOOD PRESSURE: 85 MMHG | OXYGEN SATURATION: 98 % | SYSTOLIC BLOOD PRESSURE: 136 MMHG

## 2023-03-03 VITALS — SYSTOLIC BLOOD PRESSURE: 126 MMHG | DIASTOLIC BLOOD PRESSURE: 85 MMHG

## 2023-03-03 PROCEDURE — 99214 OFFICE O/P EST MOD 30 MIN: CPT

## 2023-03-03 PROCEDURE — 99072 ADDL SUPL MATRL&STAF TM PHE: CPT

## 2023-03-03 RX ORDER — TELMISARTAN 40 MG/1
40 TABLET ORAL DAILY
Qty: 90 | Refills: 3 | Status: ACTIVE | COMMUNITY
Start: 2022-10-05 | End: 1900-01-01

## 2023-03-03 RX ORDER — ACETAMINOPHEN 325 MG/1
325 TABLET ORAL
Qty: 30 | Refills: 0 | Status: DISCONTINUED | COMMUNITY
Start: 2022-11-29

## 2023-03-03 RX ORDER — LOSARTAN POTASSIUM 50 MG/1
50 TABLET, FILM COATED ORAL
Qty: 90 | Refills: 0 | Status: DISCONTINUED | COMMUNITY
Start: 2022-04-01

## 2023-03-03 RX ORDER — AMLODIPINE BESYLATE 5 MG/1
5 TABLET ORAL
Qty: 30 | Refills: 0 | Status: DISCONTINUED | COMMUNITY
Start: 2023-01-30

## 2023-03-03 NOTE — HISTORY OF PRESENT ILLNESS
[FreeTextEntry1] : \par 47yo s/p laparoscopic sleeve gastrectomy '17, with migraine h/a (on topiramate in the past), h/o non-obstructing L nephrolithiasis '18 passed asymptomatically, HTN dx around 16 years old, CKD II with non-nephrotic proteinuria and  microscopic hematuria of unclear etiology here for f/u \par \par PCP Dr Baugh\par Hematologist Dr Keivn Davenport\par \par She's feeling well. No edema or foamy urine. BPs 130s/80s usually. On Telmisartan\par \par DMII Hx: no true DM per pt, prediabetes resolved s/p sleeve, A1C's in 5's since 2017\par \par Hematuria Hx:\par First in 2016 (once), then reoccurred in Oct 2018, microscopic hematuria on routine analysis with PCP. Never seen gross hematuria, no urinary sx, no dysuria. She thinks she's had a kidney stone "a long time ago" she had flank pain, told she had a stone and to drink a  lot of water, passed it (visualized but lost in toilet). \par \par Fam Hx: all her family with HTN, no other medical problem. 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 No etoh/drugs. Mother lives in [place].

## 2023-03-03 NOTE — ASSESSMENT
[FreeTextEntry1] : 47yo s/p laparoscopic sleeve gastrectomy '17, with migraine h/a (on topiramate in the past), h/o non-obstructing L nephrolithiasis '18 passed asymptomatically, HTN dx around 16 years old, CKD II with non-nephrotic proteinuria and  microscopic hematuria of unclear etiology here for f/u \par \par # CKD II with non-nephrotic proteinuria and microscopic hematuria\par - Cr baseline 0.0.8-1.2 eGFR 54-62 Cystatin C egfr 83-97ml/min, PCR 1.5g Sept/22\par - serologic GN w/u incl antiphospholipids and paraproteinemia all negative. Renal sono 2018 R 11cm L 10cm with 0.4cm non obstructing calculus and 1.2cm simple cyst, normal bladder. CT Urogram 2/21 mild L cortical scarring and some small simple cysts, no stones, no urinary retention. Normal adrenals. Full urologic w/u for hematuria is negative incl cystoscopy and urine cytology. \par - differentials for her proteinuria/microscopic hematuria include GN such as IgA Nephropathy, obstructive nephropathy +/- hypertensive nephrosclerosis also possible but less likely given proteinuria and hematuria that persisted despite not having stones on most recent imaging. She has a myriad of MSK sx, and intermittent leukopenia/anemia suggesting a possible systemic process such as SLE or a monoclonal paraproteinemia however the serologic w/u was negative.\par - discussed risks/benefits of kidney biopsy including bleeding, will proceed with scheduling kidney biopsy\par \par Plan \par - check urine PCR\par - increase telmisartan to 40mg daily to reach goal /70s.\par - arrange for kidney biopsy\par - f/u in 6 weeks\par \par \par

## 2023-05-03 ENCOUNTER — APPOINTMENT (OUTPATIENT)
Dept: PULMONOLOGY | Facility: CLINIC | Age: 48
End: 2023-05-03
Payer: COMMERCIAL

## 2023-05-03 VITALS
TEMPERATURE: 97.6 F | WEIGHT: 215 LBS | BODY MASS INDEX: 42.21 KG/M2 | OXYGEN SATURATION: 99 % | SYSTOLIC BLOOD PRESSURE: 120 MMHG | HEIGHT: 60 IN | DIASTOLIC BLOOD PRESSURE: 80 MMHG | HEART RATE: 90 BPM

## 2023-05-03 PROCEDURE — 99213 OFFICE O/P EST LOW 20 MIN: CPT

## 2023-05-03 NOTE — CONSULT LETTER
[Dear  ___] : Dear  [unfilled], [Courtesy Letter:] : I had the pleasure of seeing your patient, [unfilled], in my office today. [Please see my note below.] : Please see my note below. [Consult Closing:] : Thank you very much for allowing me to participate in the care of this patient.  If you have any questions, please do not hesitate to contact me. [Sincerely,] : Sincerely, [FreeTextEntry2] : Terrence Baugh MD\par [address] \[place] [address]\par  [FreeTextEntry3] : Cassidy Stewart MD, FCCP\par

## 2023-05-03 NOTE — HISTORY OF PRESENT ILLNESS
[TextBox_4] : 05/09/2022: Asked to evaluate patient by Dr Baugh for chest pain and dyspnea. Not actually a new referral - was seen by Dr Linder a year ago for dyspnea and of note, had been admitted to [facility] earlier in 2021 and treated for asthma exacerbation. Dr Linder ordered PFT, 6MWT, FENO and echo. None of this was done. She is returning with off and on chest pain and cough. She had these symptoms even before Covid, but since having Covid in March 2022 the cough is worse. She remains dyspneic on exertion, can't walk a long distance. She had no childhood asthma and she does not use the pump she was given at [facility] because she's not sure she has asthma. 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 Admitted to hospital in [place] in November for chest pain and nothing was found although she also describes that they got more focused on migraines. Had ECG and CT in the hospital. Denies having a stress or echo. The pain is burning and goes up to her throat. She feels dizzy when she gets up and she feels dyspneic on any exertion. 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 She no longer runs around with the child due to her dyspnea. She sees hematology for anemia and reports that her Hgb was 9 in December and her iron was increased.\par \par 5/3/2023: She returns with unchanged dyspnea having been unable to get the echo because as it turns out she has no coverage for diagnostic testing on her insurance plan which we found out by calling them. She went to three different facilities trying to get an echo.\par

## 2023-05-03 NOTE — ASSESSMENT
[FreeTextEntry1] : Data reviewed:\par \par PA/lat CXR [facility] 4/1/2022 personally reviewed : clear lungs\par \par PFT 5/12/22: entirely normal, no BD response / FENO 17\par \par Impression:\par Dyspnea on exertion\par Anemia by her report, but symptoms preceded anemia\par Covid March 2022 but symptoms preceded Covid\par Chest pain that sounds like reflux\par \par Plan:\par We will see if the hospital can do an echo on a sliding scale.\par Prior to figuring this out I went ahead and ordered the CPET to see if we could do that if we can't get the echo but I suppose we will have the same problem.\par Somehow she did get a treadmill stress in 10/2021 which was normal w a peak HR of 160.\par She has a $32k hospital bill from a previous admission for this problem. 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

## 2023-06-16 ENCOUNTER — EMERGENCY (EMERGENCY)
Facility: HOSPITAL | Age: 48
LOS: 1 days | Discharge: HOME | End: 2023-06-16
Attending: EMERGENCY MEDICINE | Admitting: EMERGENCY MEDICINE
Payer: COMMERCIAL

## 2023-06-16 VITALS
RESPIRATION RATE: 18 BRPM | OXYGEN SATURATION: 100 % | WEIGHT: 220.02 LBS | TEMPERATURE: 98 F | SYSTOLIC BLOOD PRESSURE: 159 MMHG | HEIGHT: 65 IN | HEART RATE: 72 BPM | DIASTOLIC BLOOD PRESSURE: 111 MMHG

## 2023-06-16 VITALS
OXYGEN SATURATION: 100 % | RESPIRATION RATE: 18 BRPM | TEMPERATURE: 98 F | HEART RATE: 75 BPM | SYSTOLIC BLOOD PRESSURE: 140 MMHG | DIASTOLIC BLOOD PRESSURE: 91 MMHG

## 2023-06-16 DIAGNOSIS — Z90.49 ACQUIRED ABSENCE OF OTHER SPECIFIED PARTS OF DIGESTIVE TRACT: Chronic | ICD-10-CM

## 2023-06-16 DIAGNOSIS — Z90.3 ACQUIRED ABSENCE OF STOMACH [PART OF]: Chronic | ICD-10-CM

## 2023-06-16 LAB
ALBUMIN SERPL ELPH-MCNC: 3.4 G/DL — SIGNIFICANT CHANGE UP (ref 3.3–5)
ALP SERPL-CCNC: 64 U/L — SIGNIFICANT CHANGE UP (ref 40–120)
ALT FLD-CCNC: SIGNIFICANT CHANGE UP (ref 10–45)
ANION GAP SERPL CALC-SCNC: 6 MMOL/L — SIGNIFICANT CHANGE UP (ref 5–17)
APTT BLD: 31.5 SEC — SIGNIFICANT CHANGE UP (ref 27.5–35.5)
AST SERPL-CCNC: SIGNIFICANT CHANGE UP (ref 10–40)
BASOPHILS # BLD AUTO: 0.02 K/UL — SIGNIFICANT CHANGE UP (ref 0–0.2)
BASOPHILS NFR BLD AUTO: 0.5 % — SIGNIFICANT CHANGE UP (ref 0–2)
BILIRUB SERPL-MCNC: 0.3 MG/DL — SIGNIFICANT CHANGE UP (ref 0.2–1.2)
BUN SERPL-MCNC: 15 MG/DL — SIGNIFICANT CHANGE UP (ref 7–23)
CALCIUM SERPL-MCNC: 8.4 MG/DL — SIGNIFICANT CHANGE UP (ref 8.4–10.5)
CHLORIDE SERPL-SCNC: 105 MMOL/L — SIGNIFICANT CHANGE UP (ref 96–108)
CO2 SERPL-SCNC: 27 MMOL/L — SIGNIFICANT CHANGE UP (ref 22–31)
CREAT SERPL-MCNC: 0.87 MG/DL — SIGNIFICANT CHANGE UP (ref 0.5–1.3)
EGFR: 82 ML/MIN/1.73M2 — SIGNIFICANT CHANGE UP
EOSINOPHIL # BLD AUTO: 0.02 K/UL — SIGNIFICANT CHANGE UP (ref 0–0.5)
EOSINOPHIL NFR BLD AUTO: 0.5 % — SIGNIFICANT CHANGE UP (ref 0–6)
GLUCOSE SERPL-MCNC: 88 MG/DL — SIGNIFICANT CHANGE UP (ref 70–99)
HCT VFR BLD CALC: 40.6 % — SIGNIFICANT CHANGE UP (ref 34.5–45)
HGB BLD-MCNC: 12.8 G/DL — SIGNIFICANT CHANGE UP (ref 11.5–15.5)
IMM GRANULOCYTES NFR BLD AUTO: 0.2 % — SIGNIFICANT CHANGE UP (ref 0–0.9)
INR BLD: 0.98 — SIGNIFICANT CHANGE UP (ref 0.88–1.16)
LYMPHOCYTES # BLD AUTO: 0.98 K/UL — LOW (ref 1–3.3)
LYMPHOCYTES # BLD AUTO: 24.3 % — SIGNIFICANT CHANGE UP (ref 13–44)
MCHC RBC-ENTMCNC: 26.7 PG — LOW (ref 27–34)
MCHC RBC-ENTMCNC: 31.5 GM/DL — LOW (ref 32–36)
MCV RBC AUTO: 84.6 FL — SIGNIFICANT CHANGE UP (ref 80–100)
MONOCYTES # BLD AUTO: 0.24 K/UL — SIGNIFICANT CHANGE UP (ref 0–0.9)
MONOCYTES NFR BLD AUTO: 6 % — SIGNIFICANT CHANGE UP (ref 2–14)
NEUTROPHILS # BLD AUTO: 2.76 K/UL — SIGNIFICANT CHANGE UP (ref 1.8–7.4)
NEUTROPHILS NFR BLD AUTO: 68.5 % — SIGNIFICANT CHANGE UP (ref 43–77)
NRBC # BLD: 0 /100 WBCS — SIGNIFICANT CHANGE UP (ref 0–0)
PLATELET # BLD AUTO: 264 K/UL — SIGNIFICANT CHANGE UP (ref 150–400)
POTASSIUM SERPL-MCNC: SIGNIFICANT CHANGE UP (ref 3.5–5.3)
POTASSIUM SERPL-SCNC: SIGNIFICANT CHANGE UP (ref 3.5–5.3)
PROT SERPL-MCNC: 6.7 G/DL — SIGNIFICANT CHANGE UP (ref 6–8.3)
PROTHROM AB SERPL-ACNC: 11.6 SEC — SIGNIFICANT CHANGE UP (ref 10.5–13.4)
RBC # BLD: 4.8 M/UL — SIGNIFICANT CHANGE UP (ref 3.8–5.2)
RBC # FLD: 15.9 % — HIGH (ref 10.3–14.5)
SODIUM SERPL-SCNC: 138 MMOL/L — SIGNIFICANT CHANGE UP (ref 135–145)
WBC # BLD: 4.03 K/UL — SIGNIFICANT CHANGE UP (ref 3.8–10.5)
WBC # FLD AUTO: 4.03 K/UL — SIGNIFICANT CHANGE UP (ref 3.8–10.5)

## 2023-06-16 PROCEDURE — 70498 CT ANGIOGRAPHY NECK: CPT | Mod: MA

## 2023-06-16 PROCEDURE — 85610 PROTHROMBIN TIME: CPT

## 2023-06-16 PROCEDURE — 96375 TX/PRO/DX INJ NEW DRUG ADDON: CPT | Mod: XU

## 2023-06-16 PROCEDURE — 85730 THROMBOPLASTIN TIME PARTIAL: CPT

## 2023-06-16 PROCEDURE — 0042T: CPT | Mod: MA

## 2023-06-16 PROCEDURE — 85025 COMPLETE CBC W/AUTO DIFF WBC: CPT

## 2023-06-16 PROCEDURE — 36415 COLL VENOUS BLD VENIPUNCTURE: CPT

## 2023-06-16 PROCEDURE — 70496 CT ANGIOGRAPHY HEAD: CPT | Mod: 26,MA

## 2023-06-16 PROCEDURE — 70496 CT ANGIOGRAPHY HEAD: CPT | Mod: MA

## 2023-06-16 PROCEDURE — 96374 THER/PROPH/DIAG INJ IV PUSH: CPT | Mod: XU

## 2023-06-16 PROCEDURE — 70450 CT HEAD/BRAIN W/O DYE: CPT | Mod: MA

## 2023-06-16 PROCEDURE — 82962 GLUCOSE BLOOD TEST: CPT

## 2023-06-16 PROCEDURE — 70498 CT ANGIOGRAPHY NECK: CPT | Mod: 26,MA

## 2023-06-16 PROCEDURE — 99285 EMERGENCY DEPT VISIT HI MDM: CPT

## 2023-06-16 PROCEDURE — 70450 CT HEAD/BRAIN W/O DYE: CPT | Mod: 26,MA,59

## 2023-06-16 PROCEDURE — 80053 COMPREHEN METABOLIC PANEL: CPT

## 2023-06-16 PROCEDURE — 99285 EMERGENCY DEPT VISIT HI MDM: CPT | Mod: 25

## 2023-06-16 RX ORDER — PROCHLORPERAZINE MALEATE 5 MG
10 TABLET ORAL ONCE
Refills: 0 | Status: COMPLETED | OUTPATIENT
Start: 2023-06-16 | End: 2023-06-16

## 2023-06-16 RX ORDER — DIPHENHYDRAMINE HCL 50 MG
25 CAPSULE ORAL ONCE
Refills: 0 | Status: COMPLETED | OUTPATIENT
Start: 2023-06-16 | End: 2023-06-16

## 2023-06-16 RX ADMIN — Medication 25 MILLIGRAM(S): at 12:20

## 2023-06-16 RX ADMIN — Medication 104 MILLIGRAM(S): at 12:50

## 2023-06-16 NOTE — CONSULT NOTE ADULT - SUBJECTIVE AND OBJECTIVE BOX
**STROKE CODE CONSULT NOTE**    Last known well time: 0500    HPI: 48y Female with PMHx of migraines (off medications since 2021), HTN presents to [facility] ED from urgent care for severe headache with left eye visual changes since 0500 today. Patient states of recently she has had poor PO intake due to abdominal pain associated with eating, went to bed at night in her USOH. Woke up today early morning around 0500 feeling okay, got out of bed and started to develop a mild headache which progressively worsened. Patient then endorsed blurry vision from her left eye, after further clarification patient actually had wavy lines in her vision with bright white dots in her vision. Patient also endorsing mild dizziness which affected her walking to some degree however pt states she was able to walk unassisted. Pt then went to urgency are where they reportedly administered toradol 60mg for pain control without relief. Stroke code called on arrival, /111, FS 84, NIHSS 0, CT imaging unremarkable for acute intracranial pathology. Patient is not a candidate for acute intervention given she is out of window for thrombolytic and no LVO noted on CTA.     In regards to her migraine history, patient states she stopped taking her migraine medications around COVID19 pandemic as she was not experiencing migraines while on the medications. Patient unable to recall what medications she was on. Per chart review, she was previously prescribed Imitrex 50mg and Topamax 25mg at a point in time which did provide her with relief at that time. She is unsure what her migraine triggers used to be. Patient states she has not been eating or drinking well due to abdominal pain.     T(C): 36.5 (06-16-23 @ 10:35), Max: 36.5 (06-16-23 @ 10:35)  HR: 72 (06-16-23 @ 10:35) (72 - 72)  BP: 159/111 (06-16-23 @ 10:35) (159/111 - 159/111)  RR: 18 (06-16-23 @ 10:35) (18 - 18)  SpO2: 100% (06-16-23 @ 10:35) (100% - 100%)    PAST MEDICAL & SURGICAL HISTORY:  Morbid obesity      Knee pain      Migraine headache      Vitamin D deficiency      Hypertension      History of cholecystectomy      History of sleeve gastrectomy          FAMILY HISTORY:      SOCIAL HISTORY:   Patient lives with *** at ***.   Smoking status:  Drinking:  Drug Use:     ROS: ***  Constitutional: No fever, weight loss or fatigue  Eyes: No eye pain, visual disturbances, or discharge  ENMT:  No difficulty hearing, tinnitus; No sinus or throat pain  Neck: No pain or stiffness  Respiratory: No cough, wheezing, chills or hemoptysis  Cardiovascular: No chest pain, palpitations, shortness of breath, or leg swelling  Gastrointestinal: No abdominal pain. No nausea, vomiting or hematemesis; No diarrhea or constipation. Nohematochezia.  Genitourinary: No dysuria, frequency, hematuria or incontinence  Neurological: As per HPI  Skin: No itching, burning, rashes or lesions   Endocrine: No heat or cold intolerance; No hair loss  Musculoskeletal: No joint pain or swelling; No muscle, back or extremity pain  Heme/Lymph: No easy bruising or bleeding gums    MEDICATIONS  (STANDING):  diphenhydrAMINE Injectable 25 milliGRAM(s) IV Push Once  prochlorperazine   IVPB 10 milliGRAM(s) IV Intermittent Once    MEDICATIONS  (PRN):    Allergies    No Known Allergies    Intolerances      Vital Signs Last 24 Hrs  T(C): 36.5 (16 Jun 2023 10:35), Max: 36.5 (16 Jun 2023 10:35)  T(F): 97.7 (16 Jun 2023 10:35), Max: 97.7 (16 Jun 2023 10:35)  HR: 72 (16 Jun 2023 10:35) (72 - 72)  BP: 159/111 (16 Jun 2023 10:35) (159/111 - 159/111)  BP(mean): --  RR: 18 (16 Jun 2023 10:35) (18 - 18)  SpO2: 100% (16 Jun 2023 10:35) (100% - 100%)    Parameters below as of 16 Jun 2023 10:35  Patient On (Oxygen Delivery Method): room air        Physical exam:  Constitutional: No acute distress, conversant  Eyes: Anicteric sclerae, moist conjunctivae, see below for CNs  Neck: trachea midline, FROM, supple, no thyromegaly or lymphadenopathy  Cardiovascular: Regular rate and rhythm, no murmurs, rubs, or gallops. No carotid bruits.   Pulmonary: Anterior breath sounds clear bilaterally, no crackles or wheezing. No use of accessory muscles  GI: Abdomen soft, non-distended, non-tender  Extremities: Radial and DP pulses +2, no edema    Neurologic:  -Mental status: Awake, alert, oriented to person, place, and time. Speech is fluent with intact naming, repetition, and comprehension, no dysarthria. Recent and remote memory intact. Follows commands. Attention/concentration intact. Fund of knowledge appropriate.  -Cranial nerves:   II: Visual fields are full to confrontation.  III, IV, VI: Extraocular movements are intact without nystagmus. Pupils equally round and reactive to light  V:  Facial sensation V1-V3 equal and intact   VII: Face is symmetric with normal eye closure and smile  VIII: Hearing is bilaterally intact to finger rub  IX, X: Uvula is midline and soft palate rises symmetrically  XI: Head turning and shoulder shrug are intact.  XII: Tongue protrudes midline  Motor: Normal bulk and tone. No pronator drift. Strength bilateral upper extremity 5/5, bilateral lower extremities 5/5.  Rapid alternating movements intact and symmetric  Sensation: Intact to light touch bilaterally. No neglect or extinction on double simultaneous testing.  Coordination: No dysmetria on finger-to-nose and heel-to-shin bilaterally  Reflexes: Downgoing toes bilaterally   Gait: Narrow gait and steady    NIHSS: **** ASPECT Score: ***** ICH Score: ****** (GCS)    Fingerstick Blood Glucose: CAPILLARY BLOOD GLUCOSE  84 (16 Jun 2023 12:02)      POCT Blood Glucose.: 84 mg/dL (16 Jun 2023 10:32)    LABS:                        12.8   4.03  )-----------( 264      ( 16 Jun 2023 10:34 )             40.6     06-16    138  |  105  |  15  ----------------------------<  88  See Note   |  27  |  0.87    Ca    8.4      16 Jun 2023 10:34    TPro  6.7  /  Alb  3.4  /  TBili  0.3  /  DBili  x   /  AST  See Note  /  ALT  See Note  /  AlkPhos  64  06-16    PT/INR - ( 16 Jun 2023 10:34 )   PT: 11.6 sec;   INR: 0.98          PTT - ( 16 Jun 2023 10:34 )  PTT:31.5 sec          RADIOLOGY & ADDITIONAL STUDIES:      -----------------------------------------------------------------------------------------------------------------  IV-tPA (Y/N):    ***                              Bolus time:    Tenecteplase Dose Verification w/ RN:  Reason IV-tPA not given: ***    **STROKE CODE CONSULT NOTE**    Last known well time: 0500    HPI: 48y Female with PMHx of migraines (off medications since 2021), HTN presents to [facility] ED from urgent care for severe headache with left eye visual changes since 0500 today. Patient states of recently she has had poor PO intake due to abdominal pain associated with eating, went to bed at night in her USOH. Woke up today early morning around 0500 feeling okay, got out of bed and started to develop a mild headache which progressively worsened. Patient then endorsed blurry vision from her left eye, after further clarification patient actually had wavy lines in her vision with bright white dots in her vision. Patient also endorsing mild dizziness which affected her walking to some degree however pt states she was able to walk unassisted. Pt then went to urgency are where they reportedly administered toradol 60mg for pain control without relief. Stroke code called on arrival, /111, FS 84, NIHSS 0, CT imaging unremarkable for acute intracranial pathology. Patient is not a candidate for acute intervention given she is out of window for thrombolytic and no LVO noted on CTA. Pt states dizziness and visual disturbance is improving but still complaining of 10/10 pain. Endorsing generalized weakness and photophobia, denies focal weakness, speech changes, room-spinning dizziness, nausea/vomiting, phonophobia, unsteady gait.     In regards to her migraine history, patient states she stopped taking her migraine medications around COVID19 pandemic as she was not experiencing migraines while on the medications. Patient unable to recall what medications she was on. Per chart review, she was previously prescribed Imitrex 50mg and Topamax 25mg at a point in time which did provide her with relief at that time. She is unsure what her migraine triggers used to be. Patient states she has not been eating or drinking well due to abdominal pain.     T(C): 36.5 (06-16-23 @ 10:35), Max: 36.5 (06-16-23 @ 10:35)  HR: 72 (06-16-23 @ 10:35) (72 - 72)  BP: 159/111 (06-16-23 @ 10:35) (159/111 - 159/111)  RR: 18 (06-16-23 @ 10:35) (18 - 18)  SpO2: 100% (06-16-23 @ 10:35) (100% - 100%)    PAST MEDICAL & SURGICAL HISTORY:  Morbid obesity      Knee pain      Migraine headache      Vitamin D deficiency      Hypertension      History of cholecystectomy      History of sleeve gastrectomy      FAMILY HISTORY:      SOCIAL HISTORY:   Patient lives with partner  Smoking status:  Drinking:  Drug Use:     ROS:   Constitutional: No fever, weight loss or fatigue  Eyes: No eye pain, or discharge  ENMT:  No difficulty hearing, tinnitus; No sinus or throat pain  Neck: No pain or stiffness  Respiratory: No cough, wheezing, chills or hemoptysis  Cardiovascular: No chest pain, palpitations, shortness of breath, or leg swelling  Gastrointestinal: No abdominal pain. No nausea, vomiting or hematemesis  Genitourinary: No dysuria, frequency, hematuria or incontinence  Neurological: As per HPI    MEDICATIONS  (STANDING):  diphenhydrAMINE Injectable 25 milliGRAM(s) IV Push Once  prochlorperazine   IVPB 10 milliGRAM(s) IV Intermittent Once    MEDICATIONS  (PRN):    Allergies    No Known Allergies    Intolerances      Vital Signs Last 24 Hrs  T(C): 36.5 (16 Jun 2023 10:35), Max: 36.5 (16 Jun 2023 10:35)  T(F): 97.7 (16 Jun 2023 10:35), Max: 97.7 (16 Jun 2023 10:35)  HR: 72 (16 Jun 2023 10:35) (72 - 72)  BP: 159/111 (16 Jun 2023 10:35) (159/111 - 159/111)  BP(mean): --  RR: 18 (16 Jun 2023 10:35) (18 - 18)  SpO2: 100% (16 Jun 2023 10:35) (100% - 100%)    Parameters below as of 16 Jun 2023 10:35  Patient On (Oxygen Delivery Method): room air    Physical exam:  Constitutional: Appears in discomfort due to pain, holding her head    Neurologic:  -Mental status: Awake, alert, oriented to person, place, and time. Speech is fluent with intact naming, repetition, and comprehension, no dysarthria. Recent and remote memory intact. Follows commands. Attention/concentration intact. Fund of knowledge appropriate.  -Cranial nerves:   II: Visual fields are full to confrontation.  III, IV, VI: Extraocular movements are intact without nystagmus. Pupils equally round and reactive to light  V:  Facial sensation V1-V3 equal and intact   VII: Face is symmetric with normal eye closure and smile  VIII: Hearing is bilaterally intact to finger rub  IX, X: Uvula is midline and soft palate rises symmetrically  XI: Head turning and shoulder shrug are intact.  XII: Tongue protrudes midline  Motor: Normal bulk and tone. No pronator drift. Strength bilateral upper extremity 5/5, bilateral lower extremities 5/5.  Rapid alternating movements intact and symmetric  Sensation: Intact to light touch bilaterally. No neglect or extinction on double simultaneous testing.  Coordination: No dysmetria on finger-to-nose and heel-to-shin bilaterally  Reflexes: Downgoing toes bilaterally   Gait: Narrow gait and steady    NIHSS: 0    Fingerstick Blood Glucose: CAPILLARY BLOOD GLUCOSE  84 (16 Jun 2023 12:02)      POCT Blood Glucose.: 84 mg/dL (16 Jun 2023 10:32)    LABS:                        12.8   4.03  )-----------( 264      ( 16 Jun 2023 10:34 )             40.6     06-16    138  |  105  |  15  ----------------------------<  88  See Note   |  27  |  0.87    Ca    8.4      16 Jun 2023 10:34    TPro  6.7  /  Alb  3.4  /  TBili  0.3  /  DBili  x   /  AST  See Note  /  ALT  See Note  /  AlkPhos  64  06-16    PT/INR - ( 16 Jun 2023 10:34 )   PT: 11.6 sec;   INR: 0.98          PTT - ( 16 Jun 2023 10:34 )  PTT:31.5 sec      RADIOLOGY & ADDITIONAL STUDIES:    < from: CT Brain Stroke Protocol (06.16.23 @ 11:04) >  IMPRESSION:    No acute intracranial hemorrhage or large transcortical infarct.    < end of copied text >    < from: CT Brain Perfusion Maps Stroke (06.16.23 @ 11:03) >  IMPRESSION:    Negative CT perfusion Study.    < end of copied text >    < from: CT Angio Brain Stroke Protocol  w/ IV Cont (06.16.23 @ 11:00) >  IMPRESSION:    No arterio-occlusive disease in the head and neck    < end of copied text >      -----------------------------------------------------------------------------------------------------------------  IV-tPA (Y/N): N                            Bolus time:    Tenecteplase Dose Verification w/ RN:  Reason IV-tPA not given: out of window, nonfocal exam, no disabling deficits

## 2023-06-16 NOTE — ED PROVIDER NOTE - OBJECTIVE STATEMENT
48y Female with PMHx of migraines (off medications since 2021), HTN presents to [facility] ED from urgent care for severe headache with left eye visual changes since 0500 today. Patient states of recently she has had poor PO intake due to abdominal pain associated with eating, went to bed at night in her USOH. Woke up today early morning around 0500 feeling okay, got out of bed and started to develop a mild headache which progressively worsened. Patient then endorsed blurry vision from her left eye, after further clarification patient actually had wavy lines in her vision with bright white dots in her vision. Patient also endorsing mild dizziness which affected her walking to some degree however pt states she was able to walk unassisted. Pt then went to urgency are where they reportedly administered toradol 60mg for pain control without relief. Stroke code called on arrival, /111, FS 84

## 2023-06-16 NOTE — ED ADULT NURSE NOTE - NSICDXPASTMEDICALHX_GEN_ALL_CORE_FT
PAST MEDICAL HISTORY:  Hypertension     Knee pain     Migraine headache     Morbid obesity     Vitamin D deficiency

## 2023-06-16 NOTE — ED ADULT TRIAGE NOTE - HISTORY OF COVID-19 VACCINATION
From: Kaya Jarquin  To: Violet Enriquez  Sent: 11/18/2022 6:09 PM CST  Subject: Cardiac    My blood pressure is not coming down like it should and my lower number is higher than it usually is AND I'm easily winded. I'm concerned I have blockages. Stress test was good when done over the summer. Not sure how to proceed.   Yes

## 2023-06-16 NOTE — ED ADULT TRIAGE NOTE - CHIEF COMPLAINT QUOTE
Pt presents to ED From [facility] C/O 10/10 HA, L sided vision changes, dizziness and feeling unsteady starting at 5AM today. Pt has hx HTN. Reports not taking medication this AM. LNW 5AM. Pt received 60mg of Toradol and zofran IM at [facility] as per EMS.

## 2023-06-16 NOTE — ED ADULT NURSE NOTE - NSFALLUNIVINTERV_ED_ALL_ED
Bed/Stretcher in lowest position, wheels locked, appropriate side rails in place/Call bell, personal items and telephone in reach/Instruct patient to call for assistance before getting out of bed/chair/stretcher/Non-slip footwear applied when patient is off stretcher/[place] to call system/Physically safe environment - no spills, clutter or unnecessary equipment/Purposeful proactive rounding/Room/bathroom lighting operational, light cord in reach

## 2023-06-16 NOTE — ED PROVIDER NOTE - NSFOLLOWUPINSTRUCTIONS_ED_ALL_ED_FT
Migraine Headache  A migraine headache is an intense, throbbing pain on one side or both sides of the head. Migraine headaches may also cause other symptoms, such as nausea, vomiting, and sensitivity to light and noise. A migraine headache can last from 4 hours to 3 days. Talk with your doctor about what things may bring on (trigger) your migraine headaches.    What are the causes?  The exact cause of this condition is not known. However, a migraine may be caused when nerves in the brain become irritated and release chemicals that cause inflammation of blood vessels. This inflammation causes pain. This condition may be triggered or caused by:  Drinking alcohol.  Smoking.  Taking medicines, such as:  Medicine used to treat chest pain (nitroglycerin).  Birth control pills.  Estrogen.  Certain blood pressure medicines.  Eating or drinking products that contain nitrates, glutamate, aspartame, or tyramine. Aged cheeses, chocolate, or caffeine may also be triggers.  Doing physical activity.  Other things that may trigger a migraine headache include:  Menstruation.  Pregnancy.  Hunger.  Stress.  Lack of sleep or too much sleep.  Weather changes.  Fatigue.  What increases the risk?  The following factors may make you more likely to experience migraine headaches:  Being a certain age. This condition is more common in people who are 25–55 years old.  Being female.  Having a family history of migraine headaches.  Being .  Having a mental health condition, such as depression or anxiety.  Being obese.  What are the signs or symptoms?  The main symptom of this condition is pulsating or throbbing pain. This pain may:  Happen in any area of the head, such as on one side or both sides.  Interfere with daily activities.  Get worse with physical activity.  Get worse with exposure to bright lights or loud noises.  Other symptoms may include:  Nausea.  Vomiting.  Dizziness.  General sensitivity to bright lights, loud noises, or smells.  Before you get a migraine headache, you may get warning signs (an aura). An aura may include:  Seeing flashing lights or having blind spots.  Seeing bright spots, halos, or zigzag lines.  Having tunnel vision or blurred vision.  Having numbness or a tingling feeling.  Having trouble talking.  Having muscle weakness.  Some people have symptoms after a migraine headache (postdromal phase), such as:  Feeling tired.  Difficulty concentrating.  How is this diagnosed?  A migraine headache can be diagnosed based on:  Your symptoms.  A physical exam.  Tests, such as:  CT scan or an MRI of the head. These imaging tests can help rule out other causes of headaches.  Taking fluid from the spine (lumbar puncture) and analyzing it (cerebrospinal fluid analysis, or CSF analysis).  How is this treated?  This condition may be treated with medicines that:  Relieve pain.  Relieve nausea.  Prevent migraine headaches.  Treatment for this condition may also include:  Acupuncture.  Lifestyle changes like avoiding foods that trigger migraine headaches.  Biofeedback.  Cognitive behavioral therapy.  Follow these instructions at home:  Medicines    Take over-the-counter and prescription medicines only as told by your health care provider.  Ask your health care provider if the medicine prescribed to you:  Requires you to avoid driving or using heavy machinery.  Can cause constipation. You may need to take these actions to prevent or treat constipation:  Drink enough fluid to keep your urine pale yellow.  Take over-the-counter or prescription medicines.  Eat foods that are high in fiber, such as beans, whole grains, and fresh fruits and vegetables.  Limit foods that are high in fat and processed sugars, such as fried or sweet foods.  Lifestyle    Do not drink alcohol.  Do not use any products that contain nicotine or tobacco, such as cigarettes, e-cigarettes, and chewing tobacco. If you need help quitting, ask your health care provider.  Get at least 8 hours of sleep every night.  Find ways to manage stress, such as meditation, deep breathing, or yoga.  General instructions        Keep a journal to find out what may trigger your migraine headaches. For example, write down:  What you eat and drink.  How much sleep you get.  Any change to your diet or medicines.  If you have a migraine headache:  Avoid things that make your symptoms worse, such as bright lights.  It may help to lie down in a dark, quiet room.  Do not drive or use heavy machinery.  Ask your health care provider what activities are safe for you while you are experiencing symptoms.  Keep all follow-up visits as told by your health care provider. This is important.  Contact a health care provider if:  You develop symptoms that are different or more severe than your usual migraine headache symptoms.  You have more than 15 headache days in one month.  Get help right away if:  Your migraine headache becomes severe.  Your migraine headache lasts longer than 72 hours.  You have a fever.  You have a stiff neck.  You have vision loss.  Your muscles feel weak or like you cannot control them.  You start to lose your balance often.  You have trouble walking.  You faint.  You have a seizure.  Summary  A migraine headache is an intense, throbbing pain on one side or both sides of the head. Migraines may also cause other symptoms, such as nausea, vomiting, and sensitivity to light and noise.  This condition may be treated with medicines and lifestyle changes. You may also need to avoid certain things that trigger a migraine headache.  Keep a journal to find out what may trigger your migraine headaches.  Contact your health care provider if you have more than 15 headache days in a month or you develop symptoms that are different or more severe than your usual migraine headache symptoms.  This information is not intended to replace advice given to you by your health care provider. Make sure you discuss any questions you have with your health care provider.    Document Revised: 04/10/2020 Document Reviewed: 01/30/2020

## 2023-06-16 NOTE — ED PROVIDER NOTE - WET READ LAUNCH FT
Patient c/o having difficulty breathing and stated that he felt a heaviness in his chest.  He said that he had felt the symptoms had begun about 2 hours ago, but he didn't say anything. He stated that when he raises his arms in the air, he is able to breathe better. Vitals obtained and were WNL. EKG done. Dr. Natasha Tsai notified of patient's status. STAT troponin ordered. New order for Nitro SL if chest pain continues given. After EKG, patient reports no chest pain and he is feeling better. States that he normally uses his CPAP at  at home, but hasn't used it since he's been at the hospital.  He was concerned using the hospital machine due to Covid. Oxygen level at room air 95%. No distress noted. There are no Wet Read(s) to document.

## 2023-06-16 NOTE — ED PROVIDER NOTE - BIRTH SEX
Female
I personally performed the service described in the documentation recorded by the scribe in my presence, and it accurately and completely records my words and actions.

## 2023-06-16 NOTE — ED PROVIDER NOTE - PATIENT PORTAL LINK FT
You can access the FollowMyHealth Patient Portal offered by [facility] by registering at the following website: [email or web address]. By joining [facility]’s FollowMyHealth portal, you will also be able to view your health information using other applications (apps) compatible with our system.

## 2023-06-16 NOTE — ED PROVIDER NOTE - PSYCHIATRIC, MLM
Alert and oriented to person, place, time/situation. normal mood and affect. no apparent risk to self or others cn/pn intact

## 2023-06-16 NOTE — ED PROVIDER NOTE - CLINICAL SUMMARY MEDICAL DECISION MAKING FREE TEXT BOX
persistent ha w dizziness- stroke code called on arrival- CT imaging unremarkable for acute intracranial pathology. Patient is not a candidate for acute intervention given she is out of window for thrombolytic and no LVO noted on CTA. Pt states dizziness and visual disturbance is improving but still complaining of 10/10 pain. Endorsing generalized weakness and photophobia, denies focal weakness, speech changes, room-spinning dizziness, nausea/vomiting, phonophobia, unsteady gait.   after meds- sx resolved feeling much better, wants to eat- feels ready to go home

## 2023-06-16 NOTE — ED ADULT NURSE NOTE - OBJECTIVE STATEMENT
"Pt presents to ED From [facility] C/O 10/10 HA, L sided vision changes, dizziness and feeling unsteady starting at 5AM today. Pt has hx HTN. Reports not taking medication this AM. LNW 5AM. Pt received 60mg of Toradol and zofran IM at [facility] as per EMS."    pt a&ox4, speaks full sentences, ambulates w/o difficulty.  confirms c/o.  no deficits noted on assessment.  seen by neuro upon stroke code. IV placed, labs sent.

## 2023-06-16 NOTE — CONSULT NOTE ADULT - ASSESSMENT
48y Female with PMHx of migraines (off medications since 2021), HTN presents to [facility] ED from urgent care for severe headache with left eye visual changes since 0500 today. Stroke code called on arrival, /111, FS 84, NIHSS 0, CT imaging unremarkable for acute intracranial pathology. Patient is not a candidate for acute intervention given she is out of window for thrombolytic and no LVO noted on CTA.    Likely refractory migraine as patient stopped her migraine medications in 2021, pt recently has had decreased PO intake, possible trigger. Low concern for ischemic etiology due to clinical presentation is more consistent with migraine with aura.     Recommend:  - migraine management per ED  - patient should keep headache journal to identify migraine triggers  - follow up with her previous neurologist or may follow up with Dr. Itz Sy for migraine management    No further stroke workup warranted at this time. Stroke to signoff.

## 2023-06-19 DIAGNOSIS — G43.909 MIGRAINE, UNSPECIFIED, NOT INTRACTABLE, WITHOUT STATUS MIGRAINOSUS: ICD-10-CM

## 2023-06-19 DIAGNOSIS — I10 ESSENTIAL (PRIMARY) HYPERTENSION: ICD-10-CM

## 2023-06-19 DIAGNOSIS — R51.9 HEADACHE, UNSPECIFIED: ICD-10-CM

## 2023-06-19 DIAGNOSIS — Z90.49 ACQUIRED ABSENCE OF OTHER SPECIFIED PARTS OF DIGESTIVE TRACT: ICD-10-CM

## 2023-06-19 DIAGNOSIS — Z98.84 BARIATRIC SURGERY STATUS: ICD-10-CM

## 2023-06-19 DIAGNOSIS — Z86.39 PERSONAL HISTORY OF OTHER ENDOCRINE, NUTRITIONAL AND METABOLIC DISEASE: ICD-10-CM

## 2023-06-20 LAB
APPEARANCE: CLEAR
BACTERIA: NEGATIVE
BILIRUBIN URINE: NEGATIVE
BLOOD URINE: ABNORMAL
COLOR: YELLOW
GLUCOSE QUALITATIVE U: NEGATIVE
HYALINE CASTS: 1 /LPF
KETONES URINE: NEGATIVE
LEUKOCYTE ESTERASE URINE: NEGATIVE
MICROSCOPIC-UA: NORMAL
NITRITE URINE: NEGATIVE
PH URINE: 6
PROTEIN URINE: ABNORMAL
RED BLOOD CELLS URINE: 6 /HPF
SPECIFIC GRAVITY URINE: 1.02
SQUAMOUS EPITHELIAL CELLS: 3 /HPF
UROBILINOGEN URINE: NORMAL
WHITE BLOOD CELLS URINE: 2 /HPF

## 2023-06-26 ENCOUNTER — APPOINTMENT (OUTPATIENT)
Dept: NEUROLOGY | Facility: CLINIC | Age: 48
End: 2023-06-26

## 2023-08-21 ENCOUNTER — APPOINTMENT (OUTPATIENT)
Dept: PULMONOLOGY | Facility: CLINIC | Age: 48
End: 2023-08-21
Payer: COMMERCIAL

## 2023-08-21 ENCOUNTER — APPOINTMENT (OUTPATIENT)
Dept: NEPHROLOGY | Facility: CLINIC | Age: 48
End: 2023-08-21
Payer: COMMERCIAL

## 2023-08-21 VITALS
HEART RATE: 86 BPM | SYSTOLIC BLOOD PRESSURE: 122 MMHG | DIASTOLIC BLOOD PRESSURE: 80 MMHG | TEMPERATURE: 97.6 F | BODY MASS INDEX: 36.49 KG/M2 | OXYGEN SATURATION: 98 % | HEIGHT: 65 IN | WEIGHT: 219 LBS

## 2023-08-21 VITALS
WEIGHT: 215 LBS | SYSTOLIC BLOOD PRESSURE: 128 MMHG | DIASTOLIC BLOOD PRESSURE: 82 MMHG | BODY MASS INDEX: 42.21 KG/M2 | HEIGHT: 60 IN | HEART RATE: 84 BPM

## 2023-08-21 PROCEDURE — 99213 OFFICE O/P EST LOW 20 MIN: CPT

## 2023-08-21 PROCEDURE — 99214 OFFICE O/P EST MOD 30 MIN: CPT

## 2023-08-21 RX ORDER — TELMISARTAN 80 MG/1
80 TABLET ORAL
Qty: 90 | Refills: 1 | Status: ACTIVE | COMMUNITY
Start: 2023-08-21 | End: 1900-01-01

## 2023-08-21 NOTE — CONSULT LETTER
[Dear  ___] : Dear  [unfilled], [Courtesy Letter:] : I had the pleasure of seeing your patient, [unfilled], in my office today. [Please see my note below.] : Please see my note below. [Consult Closing:] : Thank you very much for allowing me to participate in the care of this patient.  If you have any questions, please do not hesitate to contact me. [Sincerely,] : Sincerely, [FreeTextEntry2] : Terrence Baugh MD\par  [address] \[place] [address]\par   [FreeTextEntry3] : Cassidy Stewart MD, FCCP\par

## 2023-08-21 NOTE — ASSESSMENT
[FreeTextEntry1] : Data reviewed:  PA/lat CXR L[facility] 4/1/2022 personally reviewed : clear lungs  PFT 5/12/22: entirely normal, no BD response / FENO 17  Impression: Dyspnea on exertion - apparently spontaneously resolved Anemia by her report, but symptoms preceded anemia Covid March 2022 but symptoms preceded Covid  Plan: Given that her symptoms have resolved it doesn't make sense to do echo, CPET at this point. But good that she has good insurance, and she is welcome to return for any new symptoms.

## 2023-08-21 NOTE — HISTORY OF PRESENT ILLNESS
[FreeTextEntry1] : 48-year-old woman status post gastric sleeve surgery in 2017, hypertension since age 16, microscopic hematuria and 3-4+ proteinuria -she takes telmisartan but only 20 mg/day.  Her PCP is Dr. Baugh and her hematologist is Dr. Davenport.  In February, her creatinine was 0.92, BUN 13, GFR 77, hemoglobin 11.9, A1c 5.4, urine protein 3+.  In March, the urine showed moderate blood, 6 RBCs, and 4+ protein.  She has not experienced edema or gross hematuria.  She has never undergone kidney biopsy.

## 2023-08-21 NOTE — CONSULT LETTER
[Dear  ___] : Dear  [unfilled], [Consult Letter:] : I had the pleasure of evaluating your patient, [unfilled]. [Please see my note below.] : Please see my note below. [Consult Closing:] : Thank you very much for allowing me to participate in the care of this patient.  If you have any questions, please do not hesitate to contact me. [Sincerely,] : Sincerely, [FreeTextEntry2] : Dr Baugh [DrLiz  ___] : Dr. CALLAHAN

## 2023-08-21 NOTE — HISTORY OF PRESENT ILLNESS
[TextBox_4] : 05/09/2022: Asked to evaluate patient by Dr Baugh for chest pain and dyspnea. Not actually a new referral - was seen by Dr Linder a year ago for dyspnea and of note, had been admitted to [facility] earlier in 2021 and treated for asthma exacerbation. Dr Linder ordered PFT, 6MWT, FENO and echo. None of this was done. She is returning with off and on chest pain and cough. She had these symptoms even before Covid, but since having Covid in March 2022 the cough is worse. She remains dyspneic on exertion, can't walk a long distance. She had no childhood asthma and she does not use the pump she was given at [facility] because she's not sure she has asthma. Never smoker. 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 Very fatigued with the physical aspect of the job.  3/2/2023: Returns with dyspnea. Admitted to hospital in [place] in November for chest pain and nothing was found although she also describes that they got more focused on migraines. Had ECG and CT in the hospital. Denies having a stress or echo. The pain is burning and goes up to her throat. She feels dizzy when she gets up and she feels dyspneic on any exertion. 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 She no longer runs around with the child due to her dyspnea. She sees hematology for anemia and reports that her Hgb was 9 in December and her iron was increased.  5/3/2023: She returns with unchanged dyspnea having been unable to get the echo because as it turns out she has no coverage for diagnostic testing on her insurance plan which we found out by calling them. She went to three different facilities trying to get an echo.  8/21/2023: Her symptoms seem to have resolved after seeing me last, no longer dyspneic doing normal activities. Traveled to [place] in August for vacation and while there had a febrile illness and was given a diagnosis of typhoid fever and given Abx for 7 days and improved and traveled home yesterday. But none of this is why she is here. Had this appointment before traveling to [facility], to see about getting tests now that her insurance has changed. However is no longer dyspneic. Discussed this at some length. Is going about normal activities and just no longer feeling dyspneic.

## 2023-08-21 NOTE — ASSESSMENT
[FreeTextEntry1] : 48-year-old woman with a history of hypertension for 32 years, microscopic hematuria with 3-4+ proteinuria, adequate BP on telmisartan 20 mg daily.  I have asked her to increase telmisartan to 80 mg daily because the antiproteinuric effect of ARB's is directly dose-dependent.  She will do that for the next 4 months and will repeat urine protein to see how much improvement there is.  I told her that the likelihood is we will ask her to have a percutaneous kidney biopsy.  I have ordered labs for today to include IgA level, U ACR, BMP, Cystatin C.  I hope to see at least a 35% reduction in UACR with the higher dose of telmisartan.  I suspect she has IgA nephropathy but cannot exclude other forms of glomerulopathy.  She will return in 4 months for follow-up.

## 2023-08-21 NOTE — PHYSICAL EXAM
[General Appearance - Alert] : alert [General Appearance - In No Acute Distress] : in no acute distress [Sclera] : the sclera and conjunctiva were normal [PERRL With Normal Accommodation] : pupils were equal in size, round, and reactive to light [Outer Ear] : the ears and nose were normal in appearance [Oropharynx] : the oropharynx was normal [Neck Appearance] : the appearance of the neck was normal [Neck Cervical Mass (___cm)] : no neck mass was observed [Jugular Venous Distention Increased] : there was no jugular-venous distention [Auscultation Breath Sounds / Voice Sounds] : lungs were clear to auscultation bilaterally [Heart Rate And Rhythm] : heart rate was normal and rhythm regular [Heart Sounds] : normal S1 and S2 [Heart Sounds Gallop] : no gallops [Murmurs] : no murmurs [Heart Sounds Pericardial Friction Rub] : no pericardial rub [Skin Color & Pigmentation] : normal skin color and pigmentation [Skin Turgor] : normal skin turgor [] : no rash [Deep Tendon Reflexes (DTR)] : deep tendon reflexes were 2+ and symmetric [No Focal Deficits] : no focal deficits [Oriented To Time, Place, And Person] : oriented to person, place, and time [Impaired Insight] : insight and judgment were intact [Affect] : the affect was normal

## 2023-08-22 LAB
ANION GAP SERPL CALC-SCNC: 11 MMOL/L
APPEARANCE: CLEAR
BACTERIA: NEGATIVE /HPF
BILIRUBIN URINE: NEGATIVE
BLOOD URINE: ABNORMAL
BUN SERPL-MCNC: 10 MG/DL
CALCIUM SERPL-MCNC: 8.6 MG/DL
CAST: 2 /LPF
CHLORIDE SERPL-SCNC: 106 MMOL/L
CO2 SERPL-SCNC: 26 MMOL/L
COLOR: YELLOW
CREAT SERPL-MCNC: 1.08 MG/DL
CREAT SPEC-SCNC: 409 MG/DL
CYSTATIN C SERPL-MCNC: 0.95 MG/L
DEPRECATED KAPPA LC FREE/LAMBDA SER: 1 RATIO
EGFR: 63 ML/MIN/1.73M2
EPITHELIAL CELLS: 4 /HPF
GFR/BSA.PRED SERPLBLD CYS-BASED-ARV: 81 ML/MIN/1.73M2
GLUCOSE QUALITATIVE U: NEGATIVE MG/DL
GLUCOSE SERPL-MCNC: 113 MG/DL
IGA SER QL IEP: 251 MG/DL
IGG SER QL IEP: 991 MG/DL
IGM SER QL IEP: 114 MG/DL
KAPPA LC CSF-MCNC: 2.8 MG/DL
KAPPA LC SERPL-MCNC: 2.81 MG/DL
KETONES URINE: ABNORMAL MG/DL
LEUKOCYTE ESTERASE URINE: NEGATIVE
MICROALBUMIN 24H UR DL<=1MG/L-MCNC: 116.6 MG/DL
MICROALBUMIN/CREAT 24H UR-RTO: 285 MG/G
MICROSCOPIC-UA: NORMAL
NITRITE URINE: NEGATIVE
PH URINE: 6.5
POTASSIUM SERPL-SCNC: 4.2 MMOL/L
PROTEIN URINE: 300 MG/DL
RED BLOOD CELLS URINE: 36 /HPF
SODIUM SERPL-SCNC: 143 MMOL/L
SPECIFIC GRAVITY URINE: 1.02
UROBILINOGEN URINE: 0.2 MG/DL
WHITE BLOOD CELLS URINE: 1 /HPF

## 2023-08-30 ENCOUNTER — APPOINTMENT (OUTPATIENT)
Dept: BARIATRICS | Facility: CLINIC | Age: 48
End: 2023-08-30
Payer: COMMERCIAL

## 2023-08-30 VITALS
HEIGHT: 65 IN | TEMPERATURE: 97.4 F | OXYGEN SATURATION: 100 % | HEART RATE: 66 BPM | DIASTOLIC BLOOD PRESSURE: 89 MMHG | BODY MASS INDEX: 36.15 KG/M2 | WEIGHT: 217 LBS | SYSTOLIC BLOOD PRESSURE: 135 MMHG

## 2023-08-30 DIAGNOSIS — E66.01 MORBID (SEVERE) OBESITY DUE TO EXCESS CALORIES: ICD-10-CM

## 2023-08-30 DIAGNOSIS — Z98.84 BARIATRIC SURGERY STATUS: ICD-10-CM

## 2023-08-30 PROCEDURE — 99214 OFFICE O/P EST MOD 30 MIN: CPT

## 2023-08-30 NOTE — END OF VISIT
[FreeTextEntry3] : All medical record entries made by the Scribe were at my, BETTY Yoder , direction and personally dictated by me on 08/30/2023 . I have reviewed the chart and agree that the record accurately reflects my personal performance of the history, physical exam, assessment and plan. I have also personally directed, reviewed, and agreed with the chart.

## 2023-09-07 ENCOUNTER — APPOINTMENT (OUTPATIENT)
Dept: NEUROLOGY | Facility: CLINIC | Age: 48
End: 2023-09-07

## 2023-09-18 ENCOUNTER — APPOINTMENT (OUTPATIENT)
Dept: RADIOLOGY | Facility: HOSPITAL | Age: 48
End: 2023-09-18
Payer: COMMERCIAL

## 2023-09-18 ENCOUNTER — OUTPATIENT (OUTPATIENT)
Dept: OUTPATIENT SERVICES | Facility: HOSPITAL | Age: 48
LOS: 1 days | End: 2023-09-18
Payer: COMMERCIAL

## 2023-09-18 DIAGNOSIS — Z90.3 ACQUIRED ABSENCE OF STOMACH [PART OF]: Chronic | ICD-10-CM

## 2023-09-18 DIAGNOSIS — Z90.49 ACQUIRED ABSENCE OF OTHER SPECIFIED PARTS OF DIGESTIVE TRACT: Chronic | ICD-10-CM

## 2023-09-18 PROCEDURE — 74240 X-RAY XM UPR GI TRC 1CNTRST: CPT | Mod: 26

## 2023-09-18 PROCEDURE — 74240 X-RAY XM UPR GI TRC 1CNTRST: CPT

## 2023-10-17 ENCOUNTER — APPOINTMENT (OUTPATIENT)
Dept: GASTROENTEROLOGY | Facility: CLINIC | Age: 48
End: 2023-10-17
Payer: COMMERCIAL

## 2023-10-17 VITALS
DIASTOLIC BLOOD PRESSURE: 103 MMHG | BODY MASS INDEX: 36.29 KG/M2 | WEIGHT: 217.8 LBS | HEIGHT: 65 IN | HEART RATE: 70 BPM | RESPIRATION RATE: 16 BRPM | OXYGEN SATURATION: 98 % | TEMPERATURE: 97.7 F | SYSTOLIC BLOOD PRESSURE: 159 MMHG

## 2023-10-17 DIAGNOSIS — K21.9 GASTRO-ESOPHAGEAL REFLUX DISEASE W/OUT ESOPHAGITIS: ICD-10-CM

## 2023-10-17 DIAGNOSIS — Z90.3 ACQUIRED ABSENCE OF STOMACH [PART OF]: ICD-10-CM

## 2023-10-17 DIAGNOSIS — Z01.818 ENCOUNTER FOR OTHER PREPROCEDURAL EXAMINATION: ICD-10-CM

## 2023-10-17 PROCEDURE — 99204 OFFICE O/P NEW MOD 45 MIN: CPT

## 2023-10-19 ENCOUNTER — APPOINTMENT (OUTPATIENT)
Dept: GASTROENTEROLOGY | Facility: AMBULATORY SURGERY CENTER | Age: 48
End: 2023-10-19
Payer: COMMERCIAL

## 2023-10-19 PROCEDURE — 43239 EGD BIOPSY SINGLE/MULTIPLE: CPT

## 2023-12-13 LAB
HCT VFR BLD CALC: 39.2 %
HGB BLD-MCNC: 12.1 G/DL

## 2023-12-14 LAB
ANION GAP SERPL CALC-SCNC: 12 MMOL/L
APPEARANCE: CLEAR
BACTERIA: NEGATIVE /HPF
BILIRUBIN URINE: NEGATIVE
BLOOD URINE: ABNORMAL
BUN SERPL-MCNC: 15 MG/DL
CALCIUM SERPL-MCNC: 8.9 MG/DL
CAST: 0 /LPF
CHLORIDE SERPL-SCNC: 103 MMOL/L
CO2 SERPL-SCNC: 27 MMOL/L
COLOR: YELLOW
CREAT SERPL-MCNC: 1.16 MG/DL
CREAT SPEC-SCNC: 278 MG/DL
CYSTATIN C SERPL-MCNC: 0.99 MG/L
EGFR: 58 ML/MIN/1.73M2
EPITHELIAL CELLS: 3 /HPF
GFR/BSA.PRED SERPLBLD CYS-BASED-ARV: 77 ML/MIN/1.73M2
GLUCOSE QUALITATIVE U: NEGATIVE MG/DL
GLUCOSE SERPL-MCNC: 75 MG/DL
IRON SATN MFR SERPL: 53 %
IRON SERPL-MCNC: 127 UG/DL
KETONES URINE: NEGATIVE MG/DL
LEUKOCYTE ESTERASE URINE: ABNORMAL
MICROALBUMIN 24H UR DL<=1MG/L-MCNC: 71.4 MG/DL
MICROALBUMIN/CREAT 24H UR-RTO: 257 MG/G
MICROSCOPIC-UA: NORMAL
NITRITE URINE: NEGATIVE
PH URINE: 6.5
POTASSIUM SERPL-SCNC: 4.3 MMOL/L
PROTEIN URINE: 100 MG/DL
RED BLOOD CELLS URINE: 32 /HPF
SODIUM SERPL-SCNC: 141 MMOL/L
SPECIFIC GRAVITY URINE: 1.02
TIBC SERPL-MCNC: 242 UG/DL
UIBC SERPL-MCNC: 115 UG/DL
UROBILINOGEN URINE: 0.2 MG/DL
WHITE BLOOD CELLS URINE: 1 /HPF

## 2023-12-18 ENCOUNTER — RESULT REVIEW (OUTPATIENT)
Age: 48
End: 2023-12-18

## 2023-12-18 ENCOUNTER — APPOINTMENT (OUTPATIENT)
Dept: NEPHROLOGY | Facility: CLINIC | Age: 48
End: 2023-12-18
Payer: COMMERCIAL

## 2023-12-18 VITALS
BODY MASS INDEX: 37.99 KG/M2 | SYSTOLIC BLOOD PRESSURE: 146 MMHG | HEIGHT: 65 IN | WEIGHT: 228 LBS | DIASTOLIC BLOOD PRESSURE: 93 MMHG

## 2023-12-18 PROCEDURE — 99215 OFFICE O/P EST HI 40 MIN: CPT

## 2023-12-18 NOTE — HISTORY OF PRESENT ILLNESS
[FreeTextEntry1] : 48-year-old woman who underwent gastric sleeve surgery in 2017, and has been hypertensive for over 30 years, with unexplained microscopic hematuria and 2+ proteinuria.  Her PCP has been Dr. Baugh.  She underwent urologic evaluation by Dr. Weinstein, with no real findings.  Her renal function deteriorated between October and now, with creatinine rising from 0.89 up to 1.16, GFR declining from 80-58 by creatinine, although her GFR is 77 now by Cystatin C.  Hemoglobin is 12.1, UACR 257, urinalysis 20 RBCs.  Culture is negative.  She does have some back discomfort on the right side but not clear whether it is muscular or perhaps stone related.  There is a history of a nonobstructing left kidney stone 5 years ago.  She was seen by GI in October, noting that she had lost 100 pounds after the gastric sleeve, but gained back 30.  They were considering revision of her bariatric surgery,  so an EGD was done.   It was said to be normal, so they have abandoned and the thought of surgery again.   Her weight is now up in the high 220s and she thinks it may be time for a drug like Ozempic or Mounjaro.  She has a new PCP because Dr. FARIAS no longer takes her insurance. Dr Wendy Borjas at the [facility] office on [address]  Her BP has been high.  It was 142/101 when she saw Dr. FARIAS in late August.  It was 159/103 at her GI visit in October.

## 2023-12-18 NOTE — CONSULT LETTER
[Dear  ___] : Dear  [unfilled], [Consult Letter:] : I had the pleasure of evaluating your patient, [unfilled]. [Please see my note below.] : Please see my note below. [Consult Closing:] : Thank you very much for allowing me to participate in the care of this patient.  If you have any questions, please do not hesitate to contact me. [Sincerely,] : Sincerely, [FreeTextEntry2] : Dr Wendy Borjas - [facility] office on [address] [FreeTextEntry3] : Sincerely,   Bryant Alston MD, FACP

## 2023-12-18 NOTE — ASSESSMENT
[FreeTextEntry1] : 48-year-old woman who is hypertension is not adequately controlled on telmisartan 40 mg daily.  I am adding amlodipine 5 mg daily.  Her worsened renal function in the 2 months from October to December is a concern along with her continuing microscopic hematuria and proteinuria.  I am ordering a renal ultrasound to assess kidney size, echogenicity, and rule out a right-sided stone, or hydronephrosis.  I am ordering labs to be repeated again prior to her visit in 2 months.  If her proteinuria and microscopic hematuria continue to exhibit no obvious etiology, we may want to consider renal biopsy.  Time spent 45 minutes

## 2023-12-27 ENCOUNTER — OUTPATIENT (OUTPATIENT)
Dept: OUTPATIENT SERVICES | Facility: HOSPITAL | Age: 48
LOS: 1 days | End: 2023-12-27
Payer: COMMERCIAL

## 2023-12-27 ENCOUNTER — APPOINTMENT (OUTPATIENT)
Dept: ULTRASOUND IMAGING | Facility: HOSPITAL | Age: 48
End: 2023-12-27
Payer: COMMERCIAL

## 2023-12-27 DIAGNOSIS — Z90.3 ACQUIRED ABSENCE OF STOMACH [PART OF]: Chronic | ICD-10-CM

## 2023-12-27 DIAGNOSIS — Z90.49 ACQUIRED ABSENCE OF OTHER SPECIFIED PARTS OF DIGESTIVE TRACT: Chronic | ICD-10-CM

## 2023-12-27 PROCEDURE — 76770 US EXAM ABDO BACK WALL COMP: CPT

## 2023-12-27 PROCEDURE — 76770 US EXAM ABDO BACK WALL COMP: CPT | Mod: 26

## 2024-01-23 ENCOUNTER — APPOINTMENT (OUTPATIENT)
Dept: GASTROENTEROLOGY | Facility: CLINIC | Age: 49
End: 2024-01-23
Payer: COMMERCIAL

## 2024-01-23 VITALS
OXYGEN SATURATION: 98 % | SYSTOLIC BLOOD PRESSURE: 130 MMHG | RESPIRATION RATE: 18 BRPM | DIASTOLIC BLOOD PRESSURE: 80 MMHG | HEIGHT: 65 IN | BODY MASS INDEX: 34.66 KG/M2 | HEART RATE: 107 BPM | WEIGHT: 208 LBS | TEMPERATURE: 96.8 F

## 2024-01-23 DIAGNOSIS — E66.9 OBESITY, UNSPECIFIED: ICD-10-CM

## 2024-01-23 PROCEDURE — 99214 OFFICE O/P EST MOD 30 MIN: CPT

## 2024-01-23 NOTE — END OF VISIT
[] : Fellow [FreeTextEntry3] : Pt seen and d/w fellow.  Will start Wegovy for weight loss, f/u in two months.

## 2024-01-23 NOTE — ASSESSMENT
[FreeTextEntry1] : 48F PMHx Class II obesity s/p gastric sleeve 2017, HTN, asthma, CKD, microscopic hematuria, proteinuria referred by Dr Gama for pre-bariatric surgery EGD evaluation.  #Class I obesity  #HTN Qualifies for anti-obesity medication based on BMI, also with weight related comorbidity (i.e. HTN)  Discussed initiation of Semaglutide (Wegovy) to help facilitate weight loss, patient amenable. Noted that the medication is to be utilized in conjunction with dietary/lifestyle modifications to maximize weight loss results. Denies any personal or familial history of hypersensitivity to semaglutide or any component of the formulation; personal or family history of medullary thyroid carcinoma (MTC); patients with multiple endocrine neoplasia syndrome type 2 (MEN2), pancreatitis, or any depression, suicidal thoughts. Reviewed side effects of medication as well -Rx for Wegovy 0.25 mg/week sent to [facility], pending authorization   RTC in 2 months for follow up  Anyi Locke DO Gastroenterology Fellow

## 2024-01-23 NOTE — PHYSICAL EXAM
[Alert] : alert [Normal Voice/Communication] : normal voice/communication [Healthy Appearing] : healthy appearing [No Acute Distress] : no acute distress [Obese (BMI >= 30)] : obese (BMI >= 30) [Sclera] : the sclera and conjunctiva were normal [Hearing Threshold Finger Rub Not [place]] : hearing was normal [Oropharynx] : the oropharynx was normal [Normal Lips/Gums] : the lips and gums were normal [Normal Appearance] : the appearance of the neck was normal [No Neck Mass] : no neck mass was observed [No Acc Muscle Use] : no accessory muscle use [No Respiratory Distress] : no respiratory distress [Respiration, Rhythm And Depth] : normal respiratory rhythm and effort [Abdomen Tenderness] : non-tender [No Masses] : no abdominal mass palpated [Abdomen Soft] : soft [Cervical Lymph Nodes Enlarged Posterior Bilaterally] : no posterior cervical lymphadenopathy [Supraclavicular Lymph Nodes Enlarged Bilaterally] : no supraclavicular lymphadenopathy [Axillary Lymph Nodes Enlarged Bilaterally] : no axillary lymphadenopathy [No CVA Tenderness] : no CVA  tenderness [No Spinal Tenderness] : no spinal tenderness [Abnormal Walk] : normal gait [No Clubbing, Cyanosis] : no clubbing or cyanosis of the fingernails [No Joint Swelling] : no joint swelling seen [] : no rash [Normal Color / Pigmentation] : normal skin color and pigmentation [Cranial Nerves Intact] : cranial nerves 2-12 were intact [Sensation] : the sensory exam was normal to light touch and pinprick [Motor Exam] : the motor exam was normal [Oriented To Time, Place, And Person] : oriented to person, place, and time

## 2024-01-23 NOTE — PHYSICAL EXAM
[Alert] : alert [Normal Voice/Communication] : normal voice/communication [Healthy Appearing] : healthy appearing [No Acute Distress] : no acute distress [Obese (BMI >= 30)] : obese (BMI >= 30) [Sclera] : the sclera and conjunctiva were normal [Hearing Threshold Finger Rub Not [place]] : hearing was normal [Oropharynx] : the oropharynx was normal [Normal Lips/Gums] : the lips and gums were normal [Normal Appearance] : the appearance of the neck was normal [No Neck Mass] : no neck mass was observed [No Acc Muscle Use] : no accessory muscle use [No Respiratory Distress] : no respiratory distress [Respiration, Rhythm And Depth] : normal respiratory rhythm and effort [Abdomen Tenderness] : non-tender [No Masses] : no abdominal mass palpated [Abdomen Soft] : soft [Cervical Lymph Nodes Enlarged Posterior Bilaterally] : no posterior cervical lymphadenopathy [Supraclavicular Lymph Nodes Enlarged Bilaterally] : no supraclavicular lymphadenopathy [Axillary Lymph Nodes Enlarged Bilaterally] : no axillary lymphadenopathy [No CVA Tenderness] : no CVA  tenderness [No Spinal Tenderness] : no spinal tenderness [Abnormal Walk] : normal gait [No Clubbing, Cyanosis] : no clubbing or cyanosis of the fingernails [No Joint Swelling] : no joint swelling seen [] : no rash [Normal Color / Pigmentation] : normal skin color and pigmentation [Cranial Nerves Intact] : cranial nerves 2-12 were intact [Sensation] : the sensory exam was normal to light touch and pinprick [Motor Exam] : the motor exam was normal [Oriented To Time, Place, And Person] : oriented to person, place, and time

## 2024-01-23 NOTE — ASSESSMENT
[FreeTextEntry1] : 48F PMHx Class II obesity s/p gastric sleeve 2017, HTN, asthma, CKD, microscopic hematuria, proteinuria referred by Dr Gama for pre-bariatric surgery EGD evaluation.  #Class I obesity  #HTN Qualifies for anti-obesity medication based on BMI, also with weight related comorbidity (i.e. HTN)  Discussed initiation of Semaglutide (Wegovy) to help facilitate weight loss, patient amenable. Noted that the medication is to be utilized in conjunction with dietary/lifestyle modifications to maximize weight loss results. Denies any personal or familial history of hypersensitivity to semaglutide or any component of the formulation; personal or family history of medullary thyroid carcinoma (MTC); patients with multiple endocrine neoplasia syndrome type 2 (MEN2), pancreatitis, or any depression, suicidal thoughts. Reviewed side effects of medication as well -Rx for Wegovy 0.25 mg/week sent to [facility] pharmacy, pending authorization   RTC in 2 months for follow up  Anyi Locke DO Gastroenterology Fellow

## 2024-01-23 NOTE — HISTORY OF PRESENT ILLNESS
[FreeTextEntry1] : 48F PMHx Class II obesity s/p gastric sleeve 2017, HTN, asthma, CKD, microscopic hematuria, proteinuria referred by Dr Gama for pre-bariatric surgery EGD evaluation.  1/23/24: -EGD (10/19/23): normal esophagus, e/o previous gastric sleeve (appeared to be stretched in size), stomach otherwise unremarkable. Normal duodenum -Has lost some weight in the interim since her last visit which she attributes to stress -Occasionally experiences reflux symptoms which she states does not improve with a PPI. Otherwise denies any fevers, chills, NS, abdominal pain, nausea/vomiting, changes in bowel habits.   10/17/23: Notes long-standing history of reflux, knows her food triggers and avoids greasy foods and citrus. Frequency of reflux sxs dependent on what she eats, occurs approx 5x/week. Previously took Zantac, didn't control symptoms, discontinued when there was a medication recall. Took Omeprazole before, also didn't help.  Prior to sleeve gastrectomy, weighed approx 290-300 lb, went down to 190 lb following sleeve gastrectomy. Has regained approx 30 lbs back, currently 217 lb (BMI 36.24). Patient notes she has been eating less than before but admits that sweets are difficult to remove from her diet. Has not tried any anti-obesity medication in the past but is interested in discussing. Also open to bariatric revision surgery as well.  UGIS (9/19/23): Since 3/14/2017, again status post sleeve gastrectomy without identifiable postoperative complication.  Referred by - Dr Gama  PMx - Class II obesity s/p gastric sleeve 2017, HTN, asthma, CKD, microscopic hematuria, proteinuria, migraine PSHx - sleeve gastrectomy, CCY, Excess fat removal surgery Rx - Telmisartan, Sumatriptan PRN Supplements/herbs/OTC - none A/C or NSAIDs? - none FMHx - HTN notable in the family; no CRC or GI related malignancy; no IBD Allergies - NKDA EtOH - never Smoking - Never smoker Drugs - never Diet - non-restrictive  Imaging - UGIS (9/19/23): Since 3/14/2017, again status post sleeve gastrectomy without identifiable postoperative complication. EGD - prior to 2017 with [facility] - Dr Dyer, within the last couple of years

## 2024-01-31 ENCOUNTER — APPOINTMENT (OUTPATIENT)
Dept: NEPHROLOGY | Facility: CLINIC | Age: 49
End: 2024-01-31
Payer: COMMERCIAL

## 2024-01-31 VITALS — WEIGHT: 208 LBS | BODY MASS INDEX: 34.66 KG/M2 | HEIGHT: 65 IN

## 2024-01-31 DIAGNOSIS — R31.29 OTHER MICROSCOPIC HEMATURIA: ICD-10-CM

## 2024-01-31 DIAGNOSIS — N18.30 CHRONIC KIDNEY DISEASE, STAGE 3 UNSPECIFIED: ICD-10-CM

## 2024-01-31 LAB
ANION GAP SERPL CALC-SCNC: 11 MMOL/L
APPEARANCE: CLEAR
BACTERIA: NEGATIVE /HPF
BILIRUBIN URINE: NEGATIVE
BLOOD URINE: ABNORMAL
BUN SERPL-MCNC: 13 MG/DL
CALCIUM OXALATE CRYSTALS: PRESENT
CALCIUM SERPL-MCNC: 9.2 MG/DL
CALCIUM SERPL-MCNC: 9.2 MG/DL
CAST: 15 /LPF
CHLORIDE SERPL-SCNC: 105 MMOL/L
CO2 SERPL-SCNC: 30 MMOL/L
COLOR: NORMAL
CREAT SERPL-MCNC: 1.36 MG/DL
CREAT SPEC-SCNC: 500 MG/DL
CYSTATIN C SERPL-MCNC: 1.07 MG/L
EGFR: 48 ML/MIN/1.73M2
EPITHELIAL CELLS: 7 /HPF
GFR/BSA.PRED SERPLBLD CYS-BASED-ARV: 70 ML/MIN/1.73M2
GLUCOSE QUALITATIVE U: NEGATIVE MG/DL
GLUCOSE SERPL-MCNC: 83 MG/DL
HYALINE CASTS: PRESENT
KETONES URINE: ABNORMAL MG/DL
LEUKOCYTE ESTERASE URINE: NEGATIVE
MICROALBUMIN 24H UR DL<=1MG/L-MCNC: 156.3 MG/DL
MICROALBUMIN/CREAT 24H UR-RTO: 312 MG/G
MICROSCOPIC-UA: NORMAL
NITRITE URINE: NEGATIVE
PARATHYROID HORMONE INTACT: 43 PG/ML
PH URINE: 5.5
POTASSIUM SERPL-SCNC: 4 MMOL/L
PROTEIN URINE: 300 MG/DL
RED BLOOD CELLS URINE: 43 /HPF
REVIEW: NORMAL
SODIUM SERPL-SCNC: 145 MMOL/L
SPECIFIC GRAVITY URINE: 1.02
UROBILINOGEN URINE: 1 MG/DL
WHITE BLOOD CELLS URINE: 4 /HPF

## 2024-01-31 PROCEDURE — 99442: CPT

## 2024-01-31 RX ORDER — TELMISARTAN 80 MG/1
80 TABLET ORAL
Qty: 90 | Refills: 1 | Status: ACTIVE | COMMUNITY
Start: 2024-01-31 | End: 1900-01-01

## 2024-01-31 NOTE — PHYSICAL EXAM
[General Appearance - Alert] : alert [General Appearance - In No Acute Distress] : in no acute distress [Deep Tendon Reflexes (DTR)] : deep tendon reflexes were 2+ and symmetric [No Focal Deficits] : no focal deficits

## 2024-02-01 NOTE — HISTORY OF PRESENT ILLNESS
[Home] : at home, [unfilled] , at the time of the visit. [Medical Office: ([facility])___] : at the medical office located in  [Verbal consent obtained from patient] : the patient, [unfilled] [FreeTextEntry1] : Discussed with patient : You have chosen to receive care through the use of tele-media.  It enables healthcare providers at different locations to provide safe, effective, and convenient care through the use of technology.  Please note this is a billable encounter.  As with any healthcare service, there are risks associated with the use of tele-media, including  issues.  You understand that I cannot physically examine you and that you may need to come to the office to complete the assessment.   Patient agreed verbally and understands the risks and benefits of tele-media as explained.  All questions regarding tele-media encounters were answered.                                 48-year-old woman with a 30+ year history of hypertension, as well as unexplained microscopic hematuria and 2+ proteinuria.  She has been referred by her excellent PCP, Dr. Baugh.  She underwent gastric sleeve surgery in 2017.  She has had complete urologic evaluation by Dr. Weinstein, with no significant findings.  Her creatinine has risen from 0.89 up to 1.16, and now 1.36, within the last 16 months.  Her GFR has dropped from 70 down to 48.  Her ultrasound of the kidneys is normal.  Her IgA level is normal.  She has urine protein over 300 mg per 24 hours.  Her BP here last month was 146/93.  I asked her to add amlodipine 5 mg daily and I want to use 80 mg of telmisartan rather than the previous dose of 40.  She requests a new pharmacy, [facility] on [address].

## 2024-02-01 NOTE — ASSESSMENT
[FreeTextEntry1] : 48-year-old woman with hypertension, and worsening renal function in the last year.  She will be here February 26 for an in person visit where we can check her BP.  Other than hypertension, there is no clear-cut reason for her renal disease.  We may ultimately consider renal biopsy if this progression continues, or if proteinuria worsens.  We discussed measures to protect kidney function and decided together to start Farxiga 10 mg daily.  Time spent 13 minutes

## 2024-02-05 ENCOUNTER — NON-APPOINTMENT (OUTPATIENT)
Age: 49
End: 2024-02-05

## 2024-02-15 LAB
ANION GAP SERPL CALC-SCNC: 9 MMOL/L
BUN SERPL-MCNC: 11 MG/DL
CALCIUM SERPL-MCNC: 8.9 MG/DL
CALCIUM SERPL-MCNC: 8.9 MG/DL
CHLORIDE SERPL-SCNC: 103 MMOL/L
CO2 SERPL-SCNC: 30 MMOL/L
CREAT SERPL-MCNC: 1.14 MG/DL
CREAT SPEC-SCNC: 366 MG/DL
CYSTATIN C SERPL-MCNC: 1.08 MG/L
EGFR: 59 ML/MIN/1.73M2
GFR/BSA.PRED SERPLBLD CYS-BASED-ARV: 68 ML/MIN/1.73M2
GLUCOSE SERPL-MCNC: 75 MG/DL
MICROALBUMIN 24H UR DL<=1MG/L-MCNC: 152.9 MG/DL
MICROALBUMIN/CREAT 24H UR-RTO: 417 MG/G
PARATHYROID HORMONE INTACT: 68 PG/ML
POTASSIUM SERPL-SCNC: 4.1 MMOL/L
SODIUM SERPL-SCNC: 142 MMOL/L

## 2024-02-26 ENCOUNTER — APPOINTMENT (OUTPATIENT)
Dept: NEPHROLOGY | Facility: CLINIC | Age: 49
End: 2024-02-26
Payer: COMMERCIAL

## 2024-02-26 ENCOUNTER — RX RENEWAL (OUTPATIENT)
Age: 49
End: 2024-02-26

## 2024-02-26 VITALS
WEIGHT: 208 LBS | DIASTOLIC BLOOD PRESSURE: 86 MMHG | BODY MASS INDEX: 34.66 KG/M2 | SYSTOLIC BLOOD PRESSURE: 132 MMHG | HEIGHT: 65 IN

## 2024-02-26 DIAGNOSIS — R80.9 PROTEINURIA, UNSPECIFIED: ICD-10-CM

## 2024-02-26 DIAGNOSIS — I10 ESSENTIAL (PRIMARY) HYPERTENSION: ICD-10-CM

## 2024-02-26 DIAGNOSIS — N18.2 CHRONIC KIDNEY DISEASE, STAGE 2 (MILD): ICD-10-CM

## 2024-02-26 PROCEDURE — G2211 COMPLEX E/M VISIT ADD ON: CPT

## 2024-02-26 PROCEDURE — 99214 OFFICE O/P EST MOD 30 MIN: CPT

## 2024-02-26 RX ORDER — SEMAGLUTIDE 0.25 MG/.5ML
0.25 INJECTION, SOLUTION SUBCUTANEOUS
Qty: 1 | Refills: 0 | Status: ACTIVE | COMMUNITY
Start: 2024-01-23 | End: 1900-01-01

## 2024-02-26 RX ORDER — SEMAGLUTIDE 0.5 MG/.5ML
0.5 INJECTION, SOLUTION SUBCUTANEOUS
Qty: 1 | Refills: 0 | Status: ACTIVE | COMMUNITY
Start: 2024-02-26 | End: 1900-01-01

## 2024-02-26 NOTE — PHYSICAL EXAM
[General Appearance - Alert] : alert [General Appearance - In No Acute Distress] : in no acute distress [Sclera] : the sclera and conjunctiva were normal [PERRL With Normal Accommodation] : pupils were equal in size, round, and reactive to light [Outer Ear] : the ears and nose were normal in appearance [Oropharynx] : the oropharynx was normal [Neck Appearance] : the appearance of the neck was normal [Neck Cervical Mass (___cm)] : no neck mass was observed [Jugular Venous Distention Increased] : there was no jugular-venous distention [Auscultation Breath Sounds / Voice Sounds] : lungs were clear to auscultation bilaterally [Heart Rate And Rhythm] : heart rate was normal and rhythm regular [Heart Sounds] : normal S1 and S2 [Heart Sounds Gallop] : no gallops [Murmurs] : no murmurs [Heart Sounds Pericardial Friction Rub] : no pericardial rub [Skin Color & Pigmentation] : normal skin color and pigmentation [Skin Turgor] : normal skin turgor [] : no rash [Deep Tendon Reflexes (DTR)] : deep tendon reflexes were 2+ and symmetric [No Focal Deficits] : no focal deficits [Oriented To Time, Place, And Person] : oriented to person, place, and time [Affect] : the affect was normal [Impaired Insight] : insight and judgment were intact

## 2024-02-26 NOTE — CONSULT LETTER
[Dear  ___] : Dear  [unfilled], [Consult Letter:] : I had the pleasure of evaluating your patient, [unfilled]. [Please see my note below.] : Please see my note below. [Consult Closing:] : Thank you very much for allowing me to participate in the care of this patient.  If you have any questions, please do not hesitate to contact me. [Sincerely,] : Sincerely, [FreeTextEntry2] : Dr Baugh [FreeTextEntry3] : Sincerely,   Bryant Alston MD, FACP

## 2024-02-26 NOTE — ASSESSMENT
[FreeTextEntry1] : 49-year-old woman with improving hypertension and renal function.  Hopefully her microalbuminuria will improve as well on increased dose of ARB.  Will repeat labs again in 4 months to include UACR, A1c, BMP, Cystatin C.  If we do not get the desired antiproteinuric effect from optimal dose ARB, we can consider adding an SGLT2 inhibitor such as Farxiga.

## 2024-03-13 LAB
CYSTATIN C SERPL-MCNC: 1.11 MG/L
GFR/BSA.PRED SERPLBLD CYS-BASED-ARV: 66 ML/MIN/1.73M2

## 2024-03-14 LAB
ANION GAP SERPL CALC-SCNC: 15 MMOL/L
BUN SERPL-MCNC: 18 MG/DL
CALCIUM SERPL-MCNC: 9.3 MG/DL
CHLORIDE SERPL-SCNC: 101 MMOL/L
CO2 SERPL-SCNC: 26 MMOL/L
CREAT SERPL-MCNC: 1.38 MG/DL
CREAT SPEC-SCNC: 342 MG/DL
EGFR: 47 ML/MIN/1.73M2
ESTIMATED AVERAGE GLUCOSE: 108 MG/DL
GLUCOSE SERPL-MCNC: 76 MG/DL
HBA1C MFR BLD HPLC: 5.4 %
MICROALBUMIN 24H UR DL<=1MG/L-MCNC: 127.7 MG/DL
MICROALBUMIN/CREAT 24H UR-RTO: 373 MG/G
POTASSIUM SERPL-SCNC: 4 MMOL/L
SODIUM SERPL-SCNC: 142 MMOL/L

## 2024-03-21 ENCOUNTER — APPOINTMENT (OUTPATIENT)
Dept: GASTROENTEROLOGY | Facility: CLINIC | Age: 49
End: 2024-03-21
Payer: COMMERCIAL

## 2024-03-21 VITALS
RESPIRATION RATE: 16 BRPM | HEIGHT: 65 IN | OXYGEN SATURATION: 98 % | DIASTOLIC BLOOD PRESSURE: 83 MMHG | BODY MASS INDEX: 32.99 KG/M2 | SYSTOLIC BLOOD PRESSURE: 128 MMHG | TEMPERATURE: 96.3 F | HEART RATE: 96 BPM | WEIGHT: 198 LBS

## 2024-03-21 DIAGNOSIS — Z76.0 ENCOUNTER FOR ISSUE OF REPEAT PRESCRIPTION: ICD-10-CM

## 2024-03-21 PROCEDURE — 99214 OFFICE O/P EST MOD 30 MIN: CPT

## 2024-03-21 RX ORDER — SEMAGLUTIDE 1 MG/.5ML
1 INJECTION, SOLUTION SUBCUTANEOUS WEEKLY
Qty: 1 | Refills: 0 | Status: ACTIVE | COMMUNITY
Start: 2024-03-21 | End: 1900-01-01

## 2024-03-21 NOTE — HISTORY OF PRESENT ILLNESS
[FreeTextEntry1] : 49F PMHx Class II obesity s/p gastric sleeve 2017, HTN, asthma, CKD, microscopic hematuria, proteinuria here for medication refill of GLP-1/follow up.   3/21/24: -Since her last visit in Feb 2024, has lost 10 lb (208 lb--> 198 lb). Patient states her sugar cravings have gone away. No reported abdominal pain, nausea/vomiting. States her chronic constipation has improved while being on the medication, reports having a BM approx 3x/week.  -States being very diligent about hydration while on the medication  1/23/24: -EGD (10/19/23): normal esophagus, e/o previous gastric sleeve (appeared to be stretched in size), stomach otherwise unremarkable. Normal duodenum -Has lost some weight in the interim since her last visit which she attributes to stress -Occasionally experiences reflux symptoms which she states does not improve with a PPI. Otherwise denies any fevers, chills, NS, abdominal pain, nausea/vomiting, changes in bowel habits.  10/17/23: Notes long-standing history of reflux, knows her food triggers and avoids greasy foods and citrus. Frequency of reflux sxs dependent on what she eats, occurs approx 5x/week. Previously took Zantac, didn't control symptoms, discontinued when there was a medication recall. Took Omeprazole before, also didn't help.  Prior to sleeve gastrectomy, weighed approx 290-300 lb, went down to 190 lb following sleeve gastrectomy. Has regained approx 30 lbs back, currently 217 lb (BMI 36.24). Patient notes she has been eating less than before but admits that sweets are difficult to remove from her diet. Has not tried any anti-obesity medication in the past but is interested in discussing. Also open to bariatric revision surgery as well.  UGIS (9/19/23): Since 3/14/2017, again status post sleeve gastrectomy without identifiable postoperative complication.  Referred by - Dr Gama  [facility] - Class II obesity s/p gastric sleeve 2017, HTN, asthma, CKD, microscopic hematuria, proteinuria, migraine PSHx - sleeve gastrectomy, CCY, Excess fat removal surgery Rx - Telmisartan, Sumatriptan PRN Supplements/herbs/OTC - none A/C or NSAIDs? - none FMHx - HTN notable in the family; no CRC or GI related malignancy; no IBD Allergies - NKDA EtOH - never Smoking - Never smoker Drugs - never Diet - non-restrictive  Imaging - UGIS (9/19/23): Since 3/14/2017, again status post sleeve gastrectomy without identifiable postoperative complication. EGD - prior to 2017 with [facility] - Dr Dyer, within the last couple of years

## 2024-03-21 NOTE — PHYSICAL EXAM
[Normal Voice/Communication] : normal voice/communication [Alert] : alert [No Acute Distress] : no acute distress [Healthy Appearing] : healthy appearing [Sclera] : the sclera and conjunctiva were normal [Obese (BMI >= 30)] : obese (BMI >= 30) [Hearing Threshold Finger Rub Not [place]] : hearing was normal [Normal Lips/Gums] : the lips and gums were normal [Oropharynx] : the oropharynx was normal [Normal Appearance] : the appearance of the neck was normal [No Neck Mass] : no neck mass was observed [No Respiratory Distress] : no respiratory distress [No Acc Muscle Use] : no accessory muscle use [Respiration, Rhythm And Depth] : normal respiratory rhythm and effort [No Masses] : no abdominal mass palpated [Abdomen Tenderness] : non-tender [Abdomen Soft] : soft [Cervical Lymph Nodes Enlarged Posterior Bilaterally] : no posterior cervical lymphadenopathy [Supraclavicular Lymph Nodes Enlarged Bilaterally] : no supraclavicular lymphadenopathy [No CVA Tenderness] : no CVA  tenderness [Axillary Lymph Nodes Enlarged Bilaterally] : no axillary lymphadenopathy [No Spinal Tenderness] : no spinal tenderness [Abnormal Walk] : normal gait [No Clubbing, Cyanosis] : no clubbing or cyanosis of the fingernails [No Joint Swelling] : no joint swelling seen [] : no rash [Normal Color / Pigmentation] : normal skin color and pigmentation [Cranial Nerves Intact] : cranial nerves 2-12 were intact [Sensation] : the sensory exam was normal to light touch and pinprick [Motor Exam] : the motor exam was normal [Oriented To Time, Place, And Person] : oriented to person, place, and time

## 2024-03-21 NOTE — END OF VISIT
[] : Fellow [FreeTextEntry3] : Agree w/ excellent care provided by Dr. Sandhu. Will increase Wegovy dose as above. Counselling provided as abovev.

## 2024-03-21 NOTE — HISTORY OF PRESENT ILLNESS
[FreeTextEntry1] : 49F PMHx Class II obesity s/p gastric sleeve 2017, HTN, asthma, CKD, microscopic hematuria, proteinuria here for medication refill of GLP-1/follow up.   3/21/24: -Since her last visit in Feb 2024, has lost 10 lb (208 lb--> 198 lb). Patient states her sugar cravings have gone away. No reported abdominal pain, nausea/vomiting. States her chronic constipation has improved while being on the medication, reports having a BM approx 3x/week.  -States being very diligent about hydration while on the medication  1/23/24: -EGD (10/19/23): normal esophagus, e/o previous gastric sleeve (appeared to be stretched in size), stomach otherwise unremarkable. Normal duodenum -Has lost some weight in the interim since her last visit which she attributes to stress -Occasionally experiences reflux symptoms which she states does not improve with a PPI. Otherwise denies any fevers, chills, NS, abdominal pain, nausea/vomiting, changes in bowel habits.  10/17/23: Notes long-standing history of reflux, knows her food triggers and avoids greasy foods and citrus. Frequency of reflux sxs dependent on what she eats, occurs approx 5x/week. Previously took Zantac, didn't control symptoms, discontinued when there was a medication recall. Took Omeprazole before, also didn't help.  Prior to sleeve gastrectomy, weighed approx 290-300 lb, went down to 190 lb following sleeve gastrectomy. Has regained approx 30 lbs back, currently 217 lb (BMI 36.24). Patient notes she has been eating less than before but admits that sweets are difficult to remove from her diet. Has not tried any anti-obesity medication in the past but is interested in discussing. Also open to bariatric revision surgery as well.  UGIS (9/19/23): Since 3/14/2017, again status post sleeve gastrectomy without identifiable postoperative complication.  [facility] s/p gastric sleeve 2017, HTN, asthma, CKD, microscopic hematuria, proteinuria, migraine PSHx - sleeve gastrectomy, CCY, Excess fat removal surgery Rx - Telmisartan, Sumatriptan PRN Supplements/herbs/OTC - none A/C or NSAIDs? - none FMHx - HTN notable in the family; no CRC or GI related malignancy; no IBD Allergies - NKDA EtOH - never Smoking - Never smoker Drugs - never Diet - non-restrictive  Imaging - UGIS (9/19/23): Since 3/14/2017, again status post sleeve gastrectomy without identifiable postoperative complication. EGD - prior to 2017 with [facility] - Dr Dyer, within the last couple of years

## 2024-03-21 NOTE — ASSESSMENT
[FreeTextEntry1] : 49F PMHx Class II obesity s/p gastric sleeve 2017, HTN, asthma, CKD, microscopic hematuria, proteinuria here for medication refill of GLP-1/follow up.   #Medication refill #Obesity  Tolerating 0.5 mg weekly Wegovy, has one more dose of 0.5 mg next week No abdominal pain, nausea/vomiting; chronic constipation improved while on medication -Rx for Wegovy 1 mg weekly sent to pharmacy  -Encouraged ongoing dietary modifications -Counseled on importance of exercise in conjunction with diet  RTC in 2 months Anyi Locke DO Gastroenterology Fellow

## 2024-03-21 NOTE — PHYSICAL EXAM
[Normal Voice/Communication] : normal voice/communication [Alert] : alert [Healthy Appearing] : healthy appearing [No Acute Distress] : no acute distress [Obese (BMI >= 30)] : obese (BMI >= 30) [Sclera] : the sclera and conjunctiva were normal [Normal Lips/Gums] : the lips and gums were normal [Oropharynx] : the oropharynx was normal [Hearing Threshold Finger Rub Not [place]] : hearing was normal [Normal Appearance] : the appearance of the neck was normal [No Neck Mass] : no neck mass was observed [No Acc Muscle Use] : no accessory muscle use [No Respiratory Distress] : no respiratory distress [Respiration, Rhythm And Depth] : normal respiratory rhythm and effort [No Masses] : no abdominal mass palpated [Abdomen Tenderness] : non-tender [Abdomen Soft] : soft [Cervical Lymph Nodes Enlarged Posterior Bilaterally] : no posterior cervical lymphadenopathy [Supraclavicular Lymph Nodes Enlarged Bilaterally] : no supraclavicular lymphadenopathy [No CVA Tenderness] : no CVA  tenderness [Axillary Lymph Nodes Enlarged Bilaterally] : no axillary lymphadenopathy [Abnormal Walk] : normal gait [No Spinal Tenderness] : no spinal tenderness [No Clubbing, Cyanosis] : no clubbing or cyanosis of the fingernails [No Joint Swelling] : no joint swelling seen [] : no rash [Normal Color / Pigmentation] : normal skin color and pigmentation [Sensation] : the sensory exam was normal to light touch and pinprick [Cranial Nerves Intact] : cranial nerves 2-12 were intact [Motor Exam] : the motor exam was normal [Oriented To Time, Place, And Person] : oriented to person, place, and time

## 2024-04-29 RX ORDER — SEMAGLUTIDE 1.7 MG/.75ML
1.7 INJECTION, SOLUTION SUBCUTANEOUS
Qty: 1 | Refills: 0 | Status: ACTIVE | COMMUNITY
Start: 2024-04-29 | End: 1900-01-01

## 2024-06-13 ENCOUNTER — APPOINTMENT (OUTPATIENT)
Dept: GASTROENTEROLOGY | Facility: CLINIC | Age: 49
End: 2024-06-13
Payer: COMMERCIAL

## 2024-06-13 VITALS
RESPIRATION RATE: 16 BRPM | BODY MASS INDEX: 31.32 KG/M2 | DIASTOLIC BLOOD PRESSURE: 90 MMHG | WEIGHT: 188 LBS | SYSTOLIC BLOOD PRESSURE: 124 MMHG | HEART RATE: 98 BPM | TEMPERATURE: 96 F | HEIGHT: 65 IN | OXYGEN SATURATION: 99 %

## 2024-06-13 DIAGNOSIS — E66.9 OBESITY, UNSPECIFIED: ICD-10-CM

## 2024-06-13 DIAGNOSIS — K59.09 OTHER CONSTIPATION: ICD-10-CM

## 2024-06-13 PROCEDURE — 99215 OFFICE O/P EST HI 40 MIN: CPT

## 2024-06-13 PROCEDURE — G2211 COMPLEX E/M VISIT ADD ON: CPT

## 2024-06-13 NOTE — HISTORY OF PRESENT ILLNESS
[FreeTextEntry1] : 49F PMHx Class II obesity s/p gastric sleeve 2017, HTN, asthma, CKD, microscopic hematuria, proteinuria here for medication refill of GLP-1/follow up.  6/13/24: -Wt 217 lb --> 188 lb (today) ==> 13.4% TWL  -Notes feeling well on current medication. Recently uptitrated to 2.4 mg weekly Wegovy. Only reports some constipation which is managed by drinking prune juice. Otherwise denies any fevers, chills, NS, abdominal pain, nausea/vomiting.  3/21/24: -Since her last visit in Feb 2024, has lost 10 lb (208 lb--> 198 lb). Patient states her sugar cravings have gone away. No reported abdominal pain, nausea/vomiting. States her chronic constipation has improved while being on the medication, reports having a BM approx 3x/week. -States being very diligent about hydration while on the medication  1/23/24: -EGD (10/19/23): normal esophagus, e/o previous gastric sleeve (appeared to be stretched in size), stomach otherwise unremarkable. Normal duodenum -Has lost some weight in the interim since her last visit which she attributes to stress -Occasionally experiences reflux symptoms which she states does not improve with a PPI. Otherwise denies any fevers, chills, NS, abdominal pain, nausea/vomiting, changes in bowel habits.  10/17/23: Notes long-standing history of reflux, knows her food triggers and avoids greasy foods and citrus. Frequency of reflux sxs dependent on what she eats, occurs approx 5x/week. Previously took Zantac, didn't control symptoms, discontinued when there was a medication recall. Took Omeprazole before, also didn't help.  Prior to sleeve gastrectomy, weighed approx 290-300 lb, went down to 190 lb following sleeve gastrectomy. Has regained approx 30 lbs back, currently 217 lb (BMI 36.24). Patient notes she has been eating less than before but admits that sweets are difficult to remove from her diet. Has not tried any anti-obesity medication in the past but is interested in discussing. Also open to bariatric revision surgery as well.  UGIS (9/19/23): Since 3/14/2017, again status post sleeve gastrectomy without identifiable postoperative complication.  Referred by - Dr Gama  PMHx - Class II obesity s/p gastric sleeve 2017, HTN, asthma, CKD, microscopic hematuria, proteinuria, migraine PSHx - sleeve gastrectomy, CCY, Excess fat removal surgery Rx - Telmisartan, Sumatriptan PRN Supplements/herbs/OTC - none A/C or NSAIDs? - none FMHx - HTN notable in the family; no CRC or GI related malignancy; no IBD Allergies - NKDA EtOH - never Smoking - Never smoker Drugs - never Diet - non-restrictive  Imaging - UGIS (9/19/23): Since 3/14/2017, again status post sleeve gastrectomy without identifiable postoperative complication. EGD - prior to 2017 with [facility] - Dr Dyer, within the last couple of years

## 2024-06-13 NOTE — ASSESSMENT
[FreeTextEntry1] : 49F PMHx Class II obesity s/p gastric sleeve 2017, HTN, asthma, CKD, microscopic hematuria, proteinuria here for medication refill of GLP-1/follow up.  #Obesity #Constipation  Titrated to maximum dose of Wegovy 2.4 mg weekly, with 13.4% TWL since Oct 2023, initiated medication in March 2024  -Encouraged incorporation of moderate level aerobic exercise into routine with resistance training -Counseled on importance of nutrition to supplement medication  -c/w Prune juice to manage constipation, likely medication induced  -Reviewed most recent BW, with slight uptrend in sCr, no reported nausea/vomiting with Wegovy however admits to poor hydration. Encouraged increased hydration and ongoing f/u with [facility]  -c/w Wegovy 2.4 mg weekly dosing -BMP today to monitor sCr   RTC in Oct/Nov 2024   Anyi Locke DO Gastroenterology Fellow

## 2024-06-13 NOTE — PHYSICAL EXAM
[Alert] : alert [Normal Voice/Communication] : normal voice/communication [Healthy Appearing] : healthy appearing [No Acute Distress] : no acute distress [Sclera] : the sclera and conjunctiva were normal [Hearing Threshold Finger Rub Not [place]] : hearing was normal [Normal Lips/Gums] : the lips and gums were normal [Normal Appearance] : the appearance of the neck was normal [Oropharynx] : the oropharynx was normal [No Neck Mass] : no neck mass was observed [No Respiratory Distress] : no respiratory distress [No Acc Muscle Use] : no accessory muscle use [Respiration, Rhythm And Depth] : normal respiratory rhythm and effort [Abdomen Tenderness] : non-tender [No Masses] : no abdominal mass palpated [Abdomen Soft] : soft [Cervical Lymph Nodes Enlarged Posterior Bilaterally] : no posterior cervical lymphadenopathy [Supraclavicular Lymph Nodes Enlarged Bilaterally] : no supraclavicular lymphadenopathy [Axillary Lymph Nodes Enlarged Bilaterally] : no axillary lymphadenopathy [No CVA Tenderness] : no CVA  tenderness [No Spinal Tenderness] : no spinal tenderness [Abnormal Walk] : normal gait [No Clubbing, Cyanosis] : no clubbing or cyanosis of the fingernails [No Joint Swelling] : no joint swelling seen [Normal Color / Pigmentation] : normal skin color and pigmentation [] : no rash [Cranial Nerves Intact] : cranial nerves 2-12 were intact [Sensation] : the sensory exam was normal to light touch and pinprick [Motor Exam] : the motor exam was normal [Oriented To Time, Place, And Person] : oriented to person, place, and time

## 2024-06-17 ENCOUNTER — NON-APPOINTMENT (OUTPATIENT)
Age: 49
End: 2024-06-17

## 2024-06-17 LAB
ANION GAP SERPL CALC-SCNC: 15 MMOL/L
BUN SERPL-MCNC: 17 MG/DL
CALCIUM SERPL-MCNC: 8.7 MG/DL
CHLORIDE SERPL-SCNC: 104 MMOL/L
CO2 SERPL-SCNC: 25 MMOL/L
CREAT SERPL-MCNC: 1.17 MG/DL
EGFR: 57 ML/MIN/1.73M2
GLUCOSE SERPL-MCNC: 76 MG/DL
POTASSIUM SERPL-SCNC: 4.1 MMOL/L
SODIUM SERPL-SCNC: 143 MMOL/L

## 2024-07-15 ENCOUNTER — APPOINTMENT (OUTPATIENT)
Dept: BARIATRICS/WEIGHT MGMT | Facility: CLINIC | Age: 49
End: 2024-07-15

## 2024-11-11 ENCOUNTER — RX RENEWAL (OUTPATIENT)
Age: 49
End: 2024-11-11

## 2024-11-26 ENCOUNTER — APPOINTMENT (OUTPATIENT)
Dept: GASTROENTEROLOGY | Facility: HOSPITAL | Age: 49
End: 2024-11-26

## 2024-12-06 ENCOUNTER — APPOINTMENT (OUTPATIENT)
Dept: GASTROENTEROLOGY | Facility: CLINIC | Age: 49
End: 2024-12-06
Payer: COMMERCIAL

## 2024-12-06 ENCOUNTER — NON-APPOINTMENT (OUTPATIENT)
Age: 49
End: 2024-12-06

## 2024-12-06 VITALS
BODY MASS INDEX: 29.82 KG/M2 | DIASTOLIC BLOOD PRESSURE: 92 MMHG | HEART RATE: 87 BPM | WEIGHT: 179 LBS | HEIGHT: 65 IN | SYSTOLIC BLOOD PRESSURE: 140 MMHG | OXYGEN SATURATION: 98 % | TEMPERATURE: 97 F

## 2024-12-06 DIAGNOSIS — E66.9 OBESITY, UNSPECIFIED: ICD-10-CM

## 2024-12-06 PROCEDURE — G2211 COMPLEX E/M VISIT ADD ON: CPT | Mod: NC

## 2024-12-06 PROCEDURE — 99214 OFFICE O/P EST MOD 30 MIN: CPT

## 2024-12-07 ENCOUNTER — NON-APPOINTMENT (OUTPATIENT)
Age: 49
End: 2024-12-07

## 2024-12-07 LAB
ANION GAP SERPL CALC-SCNC: 11 MMOL/L
BUN SERPL-MCNC: 15 MG/DL
CALCIUM SERPL-MCNC: 9.2 MG/DL
CHLORIDE SERPL-SCNC: 105 MMOL/L
CO2 SERPL-SCNC: 28 MMOL/L
CREAT SERPL-MCNC: 1.07 MG/DL
EGFR: 64 ML/MIN/1.73M2
GLUCOSE SERPL-MCNC: 84 MG/DL
POTASSIUM SERPL-SCNC: 4.3 MMOL/L
SODIUM SERPL-SCNC: 144 MMOL/L

## 2025-01-21 NOTE — END OF VISIT
[] : Fellow [FreeTextEntry3] : 49F with a h/o Class II obesity s/p gastric sleeve 2017, HTN, asthma, CKD, microscopic hematuria, proteinuria here for medication refill of GLP-1/follow up. Doing well on current dose of GLP-1. F/u BMP. Prune juice for constipation.   Lana Perkins MD Gastroenterology  [Time Spent: ___ minutes] : I have spent [unfilled] minutes of time on the encounter. Risk Statement (NON-critical care)

## 2025-03-18 NOTE — ED PROVIDER NOTE - CPE EDP CARDIAC NORM
Incidental finding of small cyst on your liver which are unchanged from prior imaging.  Alternate between Tylenol and ibuprofen for your pain.  Follow-up with your primary care doctor for reevaluation.  Return to the emergency department for chest pain, shortness of breath or fainting.  
normal...

## 2025-06-04 ENCOUNTER — NON-APPOINTMENT (OUTPATIENT)
Age: 50
End: 2025-06-04

## 2025-06-04 ENCOUNTER — APPOINTMENT (OUTPATIENT)
Dept: GASTROENTEROLOGY | Facility: CLINIC | Age: 50
End: 2025-06-04
Payer: COMMERCIAL

## 2025-06-04 VITALS
BODY MASS INDEX: 29.82 KG/M2 | RESPIRATION RATE: 16 BRPM | HEIGHT: 65 IN | OXYGEN SATURATION: 98 % | DIASTOLIC BLOOD PRESSURE: 70 MMHG | WEIGHT: 179 LBS | SYSTOLIC BLOOD PRESSURE: 125 MMHG | TEMPERATURE: 97.6 F | HEART RATE: 85 BPM

## 2025-06-04 DIAGNOSIS — E66.9 OBESITY, UNSPECIFIED: ICD-10-CM

## 2025-06-04 PROCEDURE — 99214 OFFICE O/P EST MOD 30 MIN: CPT

## 2025-06-04 RX ORDER — SEMAGLUTIDE 0.25 MG/.5ML
0.25 INJECTION, SOLUTION SUBCUTANEOUS
Qty: 1 | Refills: 0 | Status: ACTIVE | COMMUNITY
Start: 2025-06-04 | End: 1900-01-01

## 2025-06-06 ENCOUNTER — APPOINTMENT (OUTPATIENT)
Dept: GASTROENTEROLOGY | Facility: CLINIC | Age: 50
End: 2025-06-06

## 2025-09-08 ENCOUNTER — APPOINTMENT (OUTPATIENT)
Dept: GASTROENTEROLOGY | Facility: CLINIC | Age: 50
End: 2025-09-08
Payer: COMMERCIAL

## 2025-09-08 VITALS
TEMPERATURE: 95.3 F | DIASTOLIC BLOOD PRESSURE: 90 MMHG | WEIGHT: 183 LBS | HEIGHT: 65 IN | HEART RATE: 82 BPM | OXYGEN SATURATION: 98 % | BODY MASS INDEX: 30.49 KG/M2 | SYSTOLIC BLOOD PRESSURE: 140 MMHG | RESPIRATION RATE: 14 BRPM

## 2025-09-08 DIAGNOSIS — Z12.11 ENCOUNTER FOR SCREENING FOR MALIGNANT NEOPLASM OF COLON: ICD-10-CM

## 2025-09-08 DIAGNOSIS — Z98.84 BARIATRIC SURGERY STATUS: ICD-10-CM

## 2025-09-08 DIAGNOSIS — E66.9 OBESITY, UNSPECIFIED: ICD-10-CM

## 2025-09-08 DIAGNOSIS — K59.09 OTHER CONSTIPATION: ICD-10-CM

## 2025-09-08 DIAGNOSIS — R63.5 ABNORMAL WEIGHT GAIN: ICD-10-CM

## 2025-09-08 PROCEDURE — 99214 OFFICE O/P EST MOD 30 MIN: CPT

## 2025-09-08 RX ORDER — POLYETHYLENE GLYCOL 3350 AND ELECTROLYTES WITH LEMON FLAVOR 236; 22.74; 6.74; 5.86; 2.97 G/4L; G/4L; G/4L; G/4L; G/4L
236 POWDER, FOR SOLUTION ORAL
Qty: 1 | Refills: 0 | Status: ACTIVE | COMMUNITY
Start: 2025-09-08 | End: 1900-01-01

## 2025-09-09 LAB
BASOPHILS # BLD AUTO: 0.02 K/UL
BASOPHILS NFR BLD AUTO: 0.6 %
EOSINOPHIL # BLD AUTO: 0.03 K/UL
EOSINOPHIL NFR BLD AUTO: 0.8 %
HCT VFR BLD CALC: 40.3 %
HGB BLD-MCNC: 12.2 G/DL
IMM GRANULOCYTES NFR BLD AUTO: 0 %
LYMPHOCYTES # BLD AUTO: 1.54 K/UL
LYMPHOCYTES NFR BLD AUTO: 42.9 %
MAN DIFF?: NORMAL
MCHC RBC-ENTMCNC: 26.3 PG
MCHC RBC-ENTMCNC: 30.3 G/DL
MCV RBC AUTO: 86.9 FL
MONOCYTES # BLD AUTO: 0.24 K/UL
MONOCYTES NFR BLD AUTO: 6.7 %
NEUTROPHILS # BLD AUTO: 1.76 K/UL
NEUTROPHILS NFR BLD AUTO: 49 %
PLATELET # BLD AUTO: 244 K/UL
RBC # BLD: 4.64 M/UL
RBC # FLD: 14.9 %
WBC # FLD AUTO: 3.59 K/UL

## 2025-09-11 LAB
ALBUMIN SERPL ELPH-MCNC: 4.3 G/DL
ALP BLD-CCNC: 86 U/L
ALT SERPL-CCNC: 42 U/L
ANION GAP SERPL CALC-SCNC: 29 MMOL/L
AST SERPL-CCNC: 56 U/L
BILIRUB SERPL-MCNC: <0.2 MG/DL
BUN SERPL-MCNC: 23 MG/DL
CALCIUM SERPL-MCNC: 7.9 MG/DL
CHLORIDE SERPL-SCNC: 110 MMOL/L
CO2 SERPL-SCNC: 14 MMOL/L
CREAT SERPL-MCNC: 1.18 MG/DL
EGFRCR SERPLBLD CKD-EPI 2021: 56 ML/MIN/1.73M2
GLUCOSE SERPL-MCNC: 53 MG/DL
POTASSIUM SERPL-SCNC: 4.8 MMOL/L
PROT SERPL-MCNC: 7.1 G/DL
SODIUM SERPL-SCNC: 153 MMOL/L

## 2025-09-15 ENCOUNTER — LABORATORY RESULT (OUTPATIENT)
Age: 50
End: 2025-09-15